# Patient Record
Sex: MALE | Race: WHITE | Employment: PART TIME | URBAN - METROPOLITAN AREA
[De-identification: names, ages, dates, MRNs, and addresses within clinical notes are randomized per-mention and may not be internally consistent; named-entity substitution may affect disease eponyms.]

---

## 2019-12-27 ENCOUNTER — AMBULATORY - HEALTHEAST (OUTPATIENT)
Dept: LAB | Facility: CLINIC | Age: 74
End: 2019-12-27

## 2019-12-27 DIAGNOSIS — Z85.6 PERSONAL HISTORY OF LYMPHOID LEUKEMIA: ICD-10-CM

## 2019-12-27 LAB
ERYTHROCYTE [DISTWIDTH] IN BLOOD BY AUTOMATED COUNT: 16.1 % (ref 11–14.5)
HCT VFR BLD AUTO: 31.1 % (ref 40–54)
HGB BLD-MCNC: 10.4 G/DL (ref 14–18)
MCH RBC QN AUTO: 34.3 PG (ref 27–34)
MCHC RBC AUTO-ENTMCNC: 33.4 G/DL (ref 32–36)
MCV RBC AUTO: 103 FL (ref 80–100)
PLATELET # BLD AUTO: 199 THOU/UL (ref 140–440)
PMV BLD AUTO: 9.9 FL (ref 8.5–12.5)
RBC # BLD AUTO: 3.03 MILL/UL (ref 4.4–6.2)
WBC: 5.1 THOU/UL (ref 4–11)

## 2020-02-27 ENCOUNTER — MEDICAL CORRESPONDENCE (OUTPATIENT)
Dept: TRANSPLANT | Facility: CLINIC | Age: 75
End: 2020-02-27

## 2020-02-27 ENCOUNTER — ALLIED HEALTH/NURSE VISIT (OUTPATIENT)
Dept: TRANSPLANT | Facility: CLINIC | Age: 75
End: 2020-02-27
Attending: INTERNAL MEDICINE
Payer: COMMERCIAL

## 2020-02-27 VITALS
SYSTOLIC BLOOD PRESSURE: 131 MMHG | HEART RATE: 73 BPM | DIASTOLIC BLOOD PRESSURE: 83 MMHG | OXYGEN SATURATION: 97 % | WEIGHT: 215.1 LBS | RESPIRATION RATE: 14 BRPM | TEMPERATURE: 98.5 F

## 2020-02-27 DIAGNOSIS — C90.10 PLASMA CELL LEUKEMIA (H): Primary | ICD-10-CM

## 2020-02-27 DIAGNOSIS — Z71.9 VISIT FOR COUNSELING: Primary | ICD-10-CM

## 2020-02-27 DIAGNOSIS — C90.10 PLASMA CELL LEUKEMIA NOT HAVING ACHIEVED REMISSION (H): Primary | ICD-10-CM

## 2020-02-27 PROCEDURE — G0463 HOSPITAL OUTPT CLINIC VISIT: HCPCS | Mod: ZF

## 2020-02-27 PROCEDURE — 40000268 ZZH STATISTIC NO CHARGES: Mod: ZF

## 2020-02-27 RX ORDER — PRAZOSIN HYDROCHLORIDE 2 MG/1
2 CAPSULE ORAL AT BEDTIME
COMMUNITY
Start: 2011-06-08

## 2020-02-27 RX ORDER — CITALOPRAM HYDROBROMIDE 20 MG/1
30 TABLET ORAL DAILY
COMMUNITY

## 2020-02-27 RX ORDER — TRIAMTERENE/HYDROCHLOROTHIAZID 37.5-25 MG
0.5 TABLET ORAL DAILY
COMMUNITY

## 2020-02-27 RX ORDER — ALBUTEROL SULFATE 90 UG/1
1-2 AEROSOL, METERED RESPIRATORY (INHALATION) EVERY 4 HOURS PRN
COMMUNITY
Start: 2018-10-18

## 2020-02-27 ASSESSMENT — PAIN SCALES - GENERAL: PAINLEVEL: NO PAIN (0)

## 2020-02-27 NOTE — LETTER
2/27/2020       RE: You Silva  [address] [facility] [address]     Dear Colleague,    Thank you for referring your patient, You Silva, to the [facility] BLOOD AND MARROW TRANSPLANT at [facility]. Please see a copy of my visit note below.    BMT Clinic Visit  Feb 27, 2020      Reason for Visit: Consult regarding auto stem cell transplant    Disease and Treatment History:  1.  Presented to his primary care doc for annual follow-up and was found to have abnormal blood counts in November 2019 with circulating atypical plasma cells.  Noted 6 months of preceding increasing fatigue and weight loss of about 15 pounds  2. Peripheral blood on 11/19/2019 had 41% atypical plasma cells.  -Bone marrow biopsy on 11/20/2019 was 90% cellularity with diffuse and extensive infiltrate of plasma cells. FISH showed Gain 1q, Del 13q, T(14;16) consistent with high risk disease   -  kappa light chains at diagnosis elevated at 11,055 and lambda light chains at 1.8.  Serum protein electrophoresis monoclonal peak 0.13  -Bone survey on 11/25/2019 was negative   -MUGA showed an EF of 67%  3. Initiated on weekly Cytoxan plus biweekly Velcade plus Decadron.  Cycle 1 was on 11/20/20192.    -Initial great response with kappa light chains down to 252 as of the end of November  -Cycle 2 (12/23/2019) adjusted to standard dose Cytoxan and Velcade and dexamethasone weekly   - Cycle 3 began on January 22, 2020  -  Cycle 4 began on 2/19/2020     HPI: You has tolerated his chemotherapy very well at this point and actually was relatively asymptomatic at diagnosis.  He notes that the only side effect has been some irritated watery and red eyes.  He notes no current fevers and chills.  No chest pain or shortness of breath, no nausea no vomiting, no diarrhea.  He notes no bone pain no bleeding or bruising and no change in baseline neuropathy     10 point review of systems otherwise negative.      Past  medical history:  1.  Plasma cell leukemia see HPI  2.  History of mild COPD  3.  History of esophageal spasm and hiatal hernia  4.  History of hypertension  5.  History of obstructive sleep apnea using a CPAP machine  6.  History of PTSD  7.  History of major depressive disorder  8.  History of numerous basal cell carcinomas status post numerous both surgery    No other diabetes, liver, kidney, cardiac issues    Medications include Bactrim, acyclovir, and, nystatin,    Social history: Former tobacco user 41 pack years quit in 2008.  Alcohol previously drank about 3 or 7 cans of beer a week.  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  Was involved with hostage situations and [employer] team.  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  Is remarried and has been with his current partner for the last 25 years.  Has 2 biologic children and his current wife has 2 children 1 of which accompanies him today.  Did have exposure to agent orange as well as water sanitation issue in the [facility]     Family history is noncontributory.      Physical Exam:  /83   Pulse 73   Temp 98.5  F (36.9  C) (Oral)   Resp 14   Wt 97.6 kg (215 lb 1.6 oz)   SpO2 97%   General: KPS 90  HEENT: Mild scleral injection but no icterus.  Oropharynx is clear without any thrush or palpable lymphadenopathy  Lungs: Clear to all station bilaterally  Cardiovascular: Regular rate and rhythm without any rubs or gallops  Extremities: No edema and no rash     Labs: Outside pathology and labs reviewed    Assessment and plan: 74-year-old gentleman with recent diagnosis of plasma cell leukemia currently showing a good serologic response to Cytoxan plus Velcade plus dexamethasone    1.  Plasma cell leukemia/myeloma:    We discussed the approach to myeloma therapy/plama cell leukemia being one of induction therapy to try to get the best response possible, and that is what he is  currently in the process of doing right now. We discussed a consolidation approach of autologous stem cell transplant or high-dose chemotherapy with stem cell rescue to further consolidate his remission, with the hopes of prolonging his progression-free survival on the average of 2-3 years.     We discussed the overall process of work-up week of testing, signing consents, and then stem cell collection. We reviewed that stem cell collection can be completed with either growth factor or chemotherapy plus growth factor stem cell mobilization to push the stem cells out into the peripheral blood and that the decision is based on the disease status at work-up. If in CR we would do G-CSF priming alone and if VGPR we would use cytoxan + G-CSF. We reviewed the admission to the hospital with high-dose melphalan, and then the subsequent infusion of the stem cells for hematopoietic rescue.     We reviewed the timing of this; that the stem cell collection after mobilization with growth factors would approximately be about a week versus if we did chemo mobilization, and that process would take on the order of 2-3 weeks before stem cells were collected.     We discussed the admission for the transplant, and the use of high-dose melphalan therapy, the risks of mucositis, nausea, vomiting, diarrhea, hair loss, drop in blood counts, infections and transplant-related mortality on the order of 3%, the need to stay in the hospital for approximately 3-4 days for the chemo and then the infusion of stem cells, and then the waiting for count recovery. We discussed the process that once he is discharged from the hospital, he would come to the clinic daily until his counts have recovered, and we would restage his myeloma at approximately day 28. We discussed that this entire process would need to be completed here at the [facility], but that once he reaches day 28, he would return to the care of his primary oncologist     We then  discussed the use of Velcade maintenance therapy given his high risk disease following autologous stem cell transplant, with the hope that that would continue to prolong the time before he would need more significant interventions for his myeloma.     We again reviewed that this is not curative therapy, but therapy that can, hopefully, improve his progression-free survival.     We reviewed the alternative of consolidation cycles of chemotherapy and then maintenance following a remission extrapolating from the randomized study comparing RVD induction with randomization to RVD consolidation versus Auto transplant with all getting maintenance therapy. This study showed no survival benefit but did show improved PFS for the Auto arm.    Thus, the decision regarding how to proceed is one that is a personal decision. If the auto transplant can delay the time to need for additional treatment, then that delays the using of alternative therapies until later thus potentially in the long run, extending life.    He and his family asked great questions and at the end felt as if they were answered. HE is interested in moving forward with transplant work-up.    Thus, we will look at bringing him for work-up around 3/16.  2. ID: acyclovir and bactrim prophy    3. CV: BP adequate       4. Pulmon: mild COPD and AMRITA    5. FEN/Renal: stable    6. Psych: depression. On celexa    Final Plan:  - complete current CyBorD cycle  - consider work-up the week of 3/16/2020    80 minutes spent with the patient with the majority in direct face to face consultation    Betty Maradiaga MD

## 2020-02-27 NOTE — NURSING NOTE
Oncology Rooming Note    February 27, 2020 7:59 AM   You Silva is a 74 year old male who presents for:    Chief Complaint   Patient presents with     Oncology Clinic Visit     New - History of basal cell carcinoma     Initial Vitals: /83   Pulse 73   Temp 98.5  F (36.9  C) (Oral)   Resp 14   SpO2 97%  There is no height or weight on file to calculate BMI. There is no height or weight on file to calculate BSA.  No Pain (0) Comment: Data Unavailable   No LMP for male patient.  Allergies reviewed: Yes  Medications reviewed: Yes    Medications: Medication refills not needed today.  Pharmacy name entered into EPIC: Data Unavailable    Clinical concerns: Patient has no new concerns.       David Burleson,EMT

## 2020-02-27 NOTE — PROGRESS NOTES
BMT Clinic Visit  Feb 27, 2020      Reason for Visit: Consult regarding auto stem cell transplant    Disease and Treatment History:  1.  Presented to his primary care doc for annual follow-up and was found to have abnormal blood counts in November 2019 with circulating atypical plasma cells.  Noted 6 months of preceding increasing fatigue and weight loss of about 15 pounds  2. Peripheral blood on 11/19/2019 had 41% atypical plasma cells.  -Bone marrow biopsy on 11/20/2019 was 90% cellularity with diffuse and extensive infiltrate of plasma cells. FISH showed Gain 1q, Del 13q, T(14;16) consistent with high risk disease   -  kappa light chains at diagnosis elevated at 11,055 and lambda light chains at 1.8.  Serum protein electrophoresis monoclonal peak 0.13  -Bone survey on 11/25/2019 was negative   -MUGA showed an EF of 67%  3. Initiated on weekly Cytoxan plus biweekly Velcade plus Decadron.  Cycle 1 was on 11/20/20192.    -Initial great response with kappa light chains down to 252 as of the end of November  -Cycle 2 (12/23/2019) adjusted to standard dose Cytoxan and Velcade and dexamethasone weekly   - Cycle 3 began on January 22, 2020  -  Cycle 4 began on 2/19/2020     HPI: You has tolerated his chemotherapy very well at this point and actually was relatively asymptomatic at diagnosis.  He notes that the only side effect has been some irritated watery and red eyes.  He notes no current fevers and chills.  No chest pain or shortness of breath, no nausea no vomiting, no diarrhea.  He notes no bone pain no bleeding or bruising and no change in baseline neuropathy     10 point review of systems otherwise negative.      Past medical history:  1.  Plasma cell leukemia see HPI  2.  History of mild COPD  3.  History of esophageal spasm and hiatal hernia  4.  History of hypertension  5.  History of obstructive sleep apnea using a CPAP machine  6.  History of PTSD  7.  History of major depressive disorder  8.  History of  numerous basal cell carcinomas status post numerous both surgery    No other diabetes, liver, kidney, cardiac issues    Medications include Bactrim, acyclovir, and, nystatin,    Social history: Former tobacco user 41 pack years quit in 2008.  Alcohol previously drank about 3 or 7 cans of beer a week.  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  Is remarried and has been with his current partner for the last 25 years.  Has 2 biologic children and his current wife has 2 children 1 of which accompanies him today.  Did have exposure to agent orange as well as water sanitation issue in the [facility]     Family history is noncontributory.      Physical Exam:  /83   Pulse 73   Temp 98.5  F (36.9  C) (Oral)   Resp 14   Wt 97.6 kg (215 lb 1.6 oz)   SpO2 97%   General: KPS 90  HEENT: Mild scleral injection but no icterus.  Oropharynx is clear without any thrush or palpable lymphadenopathy  Lungs: Clear to all station bilaterally  Cardiovascular: Regular rate and rhythm without any rubs or gallops  Extremities: No edema and no rash     Labs: Outside pathology and labs reviewed    Assessment and plan: 74-year-old gentleman with recent diagnosis of plasma cell leukemia currently showing a good serologic response to Cytoxan plus Velcade plus dexamethasone    1.  Plasma cell leukemia/myeloma:    We discussed the approach to myeloma therapy/plama cell leukemia being one of induction therapy to try to get the best response possible, and that is what he is currently in the process of doing right now. We discussed a consolidation approach of autologous stem cell transplant or high-dose chemotherapy with stem cell rescue to further consolidate his remission, with the hopes of prolonging his progression-free survival on the average of 2-3 years.      We discussed the overall process of work-up week of testing, signing consents, and then stem cell collection. We reviewed that stem cell collection can be completed with either growth factor or chemotherapy plus growth factor stem cell mobilization to push the stem cells out into the peripheral blood and that the decision is based on the disease status at work-up. If in CR we would do G-CSF priming alone and if VGPR we would use cytoxan + G-CSF. We reviewed the admission to the hospital with high-dose melphalan, and then the subsequent infusion of the stem cells for hematopoietic rescue.     We reviewed the timing of this; that the stem cell collection after mobilization with growth factors would approximately be about a week versus if we did chemo mobilization, and that process would take on the order of 2-3 weeks before stem cells were collected.     We discussed the admission for the transplant, and the use of high-dose melphalan therapy, the risks of mucositis, nausea, vomiting, diarrhea, hair loss, drop in blood counts, infections and transplant-related mortality on the order of 3%, the need to stay in the hospital for approximately 3-4 days for the chemo and then the infusion of stem cells, and then the waiting for count recovery. We discussed the process that once he is discharged from the hospital, he would come to the clinic daily until his counts have recovered, and we would restage his myeloma at approximately day 28. We discussed that this entire process would need to be completed here at the [facility], but that once he reaches day 28, he would return to the care of his primary oncologist     We then discussed the use of Velcade maintenance therapy given his high risk disease following autologous stem cell transplant, with the hope that that would continue to prolong the time before he would need more significant interventions for his myeloma.     We again reviewed that this is not  curative therapy, but therapy that can, hopefully, improve his progression-free survival.     We reviewed the alternative of consolidation cycles of chemotherapy and then maintenance following a remission extrapolating from the randomized study comparing RVD induction with randomization to RVD consolidation versus Auto transplant with all getting maintenance therapy. This study showed no survival benefit but did show improved PFS for the Auto arm.    Thus, the decision regarding how to proceed is one that is a personal decision. If the auto transplant can delay the time to need for additional treatment, then that delays the using of alternative therapies until later thus potentially in the long run, extending life.    He and his family asked great questions and at the end felt as if they were answered. HE is interested in moving forward with transplant work-up.    Thus, we will look at bringing him for work-up around 3/16.  2. ID: acyclovir and bactrim prophy    3. CV: BP adequate       4. Pulmon: mild COPD and AMRITA    5. FEN/Renal: stable    6. Psych: depression. On celexa    Final Plan:  - complete current CyBorD cycle  - consider work-up the week of 3/16/2020    80 minutes spent with the patient with the majority in direct face to face consultation    Betty Maradiaga MD

## 2020-02-27 NOTE — PROGRESS NOTES
"Blood and Marrow Transplant   New Transplant Visit with   Clinical     You Silva  2/27/2020    New Transplant MD:  Betty Maradiaga MD  New Transplant NC:  Sadia Newman RN    With whom do you live: Wife - Kasia (they have been  for 10-12 years and in a committed relationship for 25 years)    Relocation Requirement:   You \"Tonny\" LIZETH Silva lives 22 minutes / 18 miles from [facility] and will not be required to relocate post-transplant.     Diagnosis: Plasma Cell Leukemia    Transplant Type: Autologous    Family Information  Next of Kin: Kasia Silva    Phone Number: [phone]    Siblings: none    Children: Patient has 2 biological children - Annabel and Andi (they live in [place]; patient has no contact with them and they are not aware of his illness)    Wife has 2 biological questions - Toñito and Betty (they both live locally; Toñito accompanies them today)    Employment  Tonny was in the [employer] for 25 years - including tours of duty overseas. 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     Source of Income: shelter savings, [facility] connection through [facility] (he is 100% service connected - he was at [employer] and exposed to the drinking water), and pension    Do you have concerns or questions about finances or insurance related to BMT?: They will call Sepide[facility] Vega to learn about deductibles/max OOP. He will be required to get all of his medications [facility] at the [facility] - AFTER the 1st fill at [facility] s/p discharge from transplant stay.     Have you completed a health care directive?: Yes - [facility]. They will see if they have a copy at home. We may need to do a MAGGIE to get a copy from the [facility].     Support:  Is there a network of people who are available to support you and/or your family?: Yes    Education Provided:   Caregiver Requirement/Role  BMT Packet provided  BMT Book provided  HCD information and blank document  Support resources  Description of " "Inpatient Unit    Comments: Tonny comes to his NT appointment with his wife Kasia and Kasia's son Toñito. Tonny felt that his discussion with Dr. Maradiaga was \"very thorough\" and they understand the process now. We talked about the process of transplant - they were told by the [facility] MD that his wife would need to be with patient 24 hours/day while hospitalized - we talked about how this is not accurate. He will require a caregiver 30 days post-transplant 24/7. They have no concerns about caregivers. Encouraged them to call with questions or concerns as they arise.     Lacey FLOYD [facility]    Clinical   2/27/2020  [facility]  Adult Blood and Marrow Transplant Program  [address] [place] [id number]  [email or web address]  [email or web address]  Office: [phone]   Pager: [phone]        "

## 2020-03-02 ENCOUNTER — DOCUMENTATION ONLY (OUTPATIENT)
Dept: TRANSPLANT | Facility: CLINIC | Age: 75
End: 2020-03-02

## 2020-03-02 DIAGNOSIS — C90.11 PLASMA CELL LEUKEMIA IN REMISSION (H): Primary | ICD-10-CM

## 2020-03-02 DIAGNOSIS — Z86.2 PERSONAL HISTORY OF DISEASES OF BLOOD AND BLOOD-FORMING ORGANS: ICD-10-CM

## 2020-03-02 NOTE — PROGRESS NOTES
You Silva's medical conditions were reviewed at the BMT Protocol Review Committee meeting on March 2, 2020    Considerations from the Committee included the following:  Plasma cell leukemia    BMT Primary Protocol: Auto Myeloma    BMT Ancillary Protocols:     N/A    There is no problem list on file for this patient.      Patient Care Team       Relationship Specialty Notifications Start End    Stefano Mcleod MD PCP - General   2/27/20     Phone: [phone] Fax: [phone]         [facility] [address] [place] [id number]    Stefano Mcleod MD Referring Physician Internal Medicine-Hematology & Oncology  2/12/20     Phone: [phone] Fax: [phone]         [facility] [address] [place] [id number]

## 2020-03-02 NOTE — PROGRESS NOTES
Met with You, wife and S-I-L following new transplant visit with Dr. Maradiaga. Reviewed plan of care per NT conversation for Stem Cell Transplant. Explained role of the Nurse Coordinator throughout the BMT process as well as general time line and expectations for transplant. Discussed necessity of caregiver and program's proximity requirements. All questions were answered. Plan:  AutologousTransplant following current cycle of CyBordD    Contact information provided for Yves Ortiz: Yes    HLA typing drawn: No    PRA typing drawn: No    Contact information provided for : No    Financial Release for [facility] search obtained: No

## 2020-03-05 PROCEDURE — 00000345 ZZHCL STATISTIC REV BONE MARROW OUTSIDE SLIDES TC 88321: Performed by: INTERNAL MEDICINE

## 2020-03-06 ENCOUNTER — DOCUMENTATION ONLY (OUTPATIENT)
Dept: CARE COORDINATION | Facility: CLINIC | Age: 75
End: 2020-03-06

## 2020-03-06 LAB — COPATH REPORT: NORMAL

## 2020-03-06 NOTE — TELEPHONE ENCOUNTER
DIAGNOSIS: AUTO DOUBLE LUMEN LARGE BOR TUNNELED PHERESIS CATH   DATE RECEIVED: 3.16.20   NOTES STATUS DETAILS   OFFICE NOTE from referring provider Internal 2.27.20 Dr. Betty Maradiaga   OFFICE NOTE from other specialist N/A    DISCHARGE SUMMARY from hospital N/A    DISCHARGE REPORT from the ER N/A    OPERATIVE REPORT N/A    MEDICATION LIST Internal    XRAYS (IMAGES & REPORTS) In Pacs PET- *sched* for 3.17.20  Chest Xray- *sched* for 3.16.20, 12.23.19   PATHOLOGY  Slides & report N/A

## 2020-03-11 ENCOUNTER — MEDICAL CORRESPONDENCE (OUTPATIENT)
Dept: TRANSPLANT | Facility: CLINIC | Age: 75
End: 2020-03-11

## 2020-03-13 PROCEDURE — 00000345 ZZHCL STATISTIC REV BONE MARROW OUTSIDE SLIDES TC 88321: Performed by: INTERNAL MEDICINE

## 2020-03-16 ENCOUNTER — PRE VISIT (OUTPATIENT)
Dept: INTERVENTIONAL RADIOLOGY/VASCULAR | Facility: CLINIC | Age: 75
End: 2020-03-16

## 2020-03-16 ENCOUNTER — MEDICAL CORRESPONDENCE (OUTPATIENT)
Dept: TRANSPLANT | Facility: CLINIC | Age: 75
End: 2020-03-16

## 2020-03-16 LAB — COPATH REPORT: NORMAL

## 2020-03-17 ENCOUNTER — MEDICAL CORRESPONDENCE (OUTPATIENT)
Dept: TRANSPLANT | Facility: CLINIC | Age: 75
End: 2020-03-17

## 2020-03-23 ENCOUNTER — OFFICE VISIT (OUTPATIENT)
Dept: TRANSPLANT | Facility: CLINIC | Age: 75
End: 2020-03-23
Attending: INTERNAL MEDICINE
Payer: COMMERCIAL

## 2020-03-23 ENCOUNTER — PRE VISIT (OUTPATIENT)
Dept: INTERVENTIONAL RADIOLOGY/VASCULAR | Facility: CLINIC | Age: 75
End: 2020-03-23

## 2020-03-23 ENCOUNTER — OFFICE VISIT (OUTPATIENT)
Dept: INTERVENTIONAL RADIOLOGY/VASCULAR | Facility: CLINIC | Age: 75
End: 2020-03-23
Attending: INTERNAL MEDICINE
Payer: COMMERCIAL

## 2020-03-23 VITALS
HEART RATE: 67 BPM | BODY MASS INDEX: 28.31 KG/M2 | HEIGHT: 72 IN | TEMPERATURE: 97.4 F | OXYGEN SATURATION: 93 % | DIASTOLIC BLOOD PRESSURE: 80 MMHG | RESPIRATION RATE: 18 BRPM | WEIGHT: 209 LBS | SYSTOLIC BLOOD PRESSURE: 130 MMHG

## 2020-03-23 DIAGNOSIS — C90.11 PLASMA CELL LEUKEMIA IN REMISSION (H): ICD-10-CM

## 2020-03-23 DIAGNOSIS — Z86.2 PERSONAL HISTORY OF DISEASES OF BLOOD AND BLOOD-FORMING ORGANS: ICD-10-CM

## 2020-03-23 DIAGNOSIS — C90.11 PLASMA CELL LEUKEMIA IN REMISSION (H): Primary | ICD-10-CM

## 2020-03-23 DIAGNOSIS — R82.90 UNSPECIFIED ABNORMAL FINDINGS IN URINE: ICD-10-CM

## 2020-03-23 DIAGNOSIS — K22.4 ESOPHAGEAL SPASM: Primary | ICD-10-CM

## 2020-03-23 PROBLEM — C90.10: Status: ACTIVE | Noted: 2020-03-23

## 2020-03-23 PROBLEM — Z87.19 S/P LAPAROSCOPIC HERNIA REPAIR: Status: ACTIVE | Noted: 2017-02-16

## 2020-03-23 PROBLEM — Z85.828 HISTORY OF BASAL CELL CARCINOMA: Status: ACTIVE | Noted: 2018-07-31

## 2020-03-23 PROBLEM — G47.33 OBSTRUCTIVE SLEEP APNEA ON CPAP: Status: ACTIVE | Noted: 2017-11-17

## 2020-03-23 PROBLEM — J44.9 COPD, MILD (H): Status: ACTIVE | Noted: 2018-02-08

## 2020-03-23 PROBLEM — Z98.890 S/P LAPAROSCOPIC HERNIA REPAIR: Status: ACTIVE | Noted: 2017-02-16

## 2020-03-23 PROBLEM — Z87.891 PERSONAL HISTORY OF TOBACCO USE, PRESENTING HAZARDS TO HEALTH: Status: ACTIVE | Noted: 2018-02-08

## 2020-03-23 LAB
ABO + RH BLD: NORMAL
ABO + RH BLD: NORMAL
ALBUMIN SERPL-MCNC: 3.8 G/DL (ref 3.4–5)
ALBUMIN UR-MCNC: NEGATIVE MG/DL
ALP SERPL-CCNC: 69 U/L (ref 40–150)
ALT SERPL W P-5'-P-CCNC: 18 U/L (ref 0–70)
ANION GAP SERPL CALCULATED.3IONS-SCNC: 4 MMOL/L (ref 3–14)
APPEARANCE UR: CLEAR
APTT PPP: 29 SEC (ref 22–37)
AST SERPL W P-5'-P-CCNC: 9 U/L (ref 0–45)
AUTO BMR FREEZE: NORMAL
B2 MICROGLOB SERPL-MCNC: 2.3 MG/L
BACTERIA #/AREA URNS HPF: ABNORMAL /HPF
BASOPHILS # BLD AUTO: 0 10E9/L (ref 0–0.2)
BASOPHILS NFR BLD AUTO: 0.6 %
BILIRUB SERPL-MCNC: 0.6 MG/DL (ref 0.2–1.3)
BILIRUB UR QL STRIP: NEGATIVE
BLD GP AB SCN SERPL QL: NORMAL
BLOOD BANK CMNT PATIENT-IMP: NORMAL
BUN SERPL-MCNC: 12 MG/DL (ref 7–30)
CALCIUM SERPL-MCNC: 9.3 MG/DL (ref 8.5–10.1)
CHLORIDE SERPL-SCNC: 107 MMOL/L (ref 94–109)
CO2 SERPL-SCNC: 28 MMOL/L (ref 20–32)
COLOR UR AUTO: YELLOW
CREAT SERPL-MCNC: 0.67 MG/DL (ref 0.66–1.25)
DEPRECATED CALCIDIOL+CALCIFEROL SERPL-MC: 46 UG/L (ref 20–75)
DIFFERENTIAL METHOD BLD: ABNORMAL
EBV VCA IGG SER QL IA: >8 AI (ref 0–0.8)
EOSINOPHIL # BLD AUTO: 0.1 10E9/L (ref 0–0.7)
EOSINOPHIL NFR BLD AUTO: 1.5 %
ERYTHROCYTE [DISTWIDTH] IN BLOOD BY AUTOMATED COUNT: 15.9 % (ref 10–15)
GFR SERPL CREATININE-BSD FRML MDRD: >90 ML/MIN/{1.73_M2}
GLUCOSE SERPL-MCNC: 97 MG/DL (ref 70–99)
GLUCOSE UR STRIP-MCNC: NEGATIVE MG/DL
HCT VFR BLD AUTO: 39.4 % (ref 40–53)
HGB BLD-MCNC: 13.3 G/DL (ref 13.3–17.7)
HGB UR QL STRIP: NEGATIVE
HSV1 IGG SERPL QL IA: 1.5 AI (ref 0–0.8)
HSV2 IGG SERPL QL IA: 4.6 AI (ref 0–0.8)
IGE SERPL-ACNC: <2 KIU/L (ref 0–114)
IMM GRANULOCYTES # BLD: 0 10E9/L (ref 0–0.4)
IMM GRANULOCYTES NFR BLD: 0.8 %
INR PPP: 1.04 (ref 0.86–1.14)
KAPPA LC UR-MCNC: 43.53 MG/DL (ref 0.33–1.94)
KAPPA LC/LAMBDA SER: 229.11 {RATIO} (ref 0.26–1.65)
KETONES UR STRIP-MCNC: NEGATIVE MG/DL
LAMBDA LC SERPL-MCNC: 0.19 MG/DL (ref 0.57–2.63)
LDH SERPL L TO P-CCNC: 152 U/L (ref 85–227)
LEUKOCYTE ESTERASE UR QL STRIP: NEGATIVE
LYMPHOCYTES # BLD AUTO: 0.8 10E9/L (ref 0.8–5.3)
LYMPHOCYTES NFR BLD AUTO: 15.2 %
MAGNESIUM SERPL-MCNC: 2.2 MG/DL (ref 1.6–2.3)
MCH RBC QN AUTO: 31.5 PG (ref 26.5–33)
MCHC RBC AUTO-ENTMCNC: 33.8 G/DL (ref 31.5–36.5)
MCV RBC AUTO: 93 FL (ref 78–100)
MONOCYTES # BLD AUTO: 0.7 10E9/L (ref 0–1.3)
MONOCYTES NFR BLD AUTO: 12.8 %
MUCOUS THREADS #/AREA URNS LPF: PRESENT /LPF
NEUTROPHILS # BLD AUTO: 3.6 10E9/L (ref 1.6–8.3)
NEUTROPHILS NFR BLD AUTO: 69.1 %
NITRATE UR QL: NEGATIVE
NRBC # BLD AUTO: 0 10*3/UL
NRBC BLD AUTO-RTO: 0 /100
PH UR STRIP: 6 PH (ref 5–7)
PHOSPHATE SERPL-MCNC: 2.7 MG/DL (ref 2.5–4.5)
PLATELET # BLD AUTO: 243 10E9/L (ref 150–450)
POTASSIUM SERPL-SCNC: 3.8 MMOL/L (ref 3.4–5.3)
PROT SERPL-MCNC: 6.1 G/DL (ref 6.8–8.8)
RBC # BLD AUTO: 4.22 10E12/L (ref 4.4–5.9)
RBC #/AREA URNS AUTO: <1 /HPF (ref 0–2)
SODIUM SERPL-SCNC: 138 MMOL/L (ref 133–144)
SOURCE: ABNORMAL
SP GR UR STRIP: 1.01 (ref 1–1.03)
SPECIMEN EXP DATE BLD: NORMAL
URATE SERPL-MCNC: 5 MG/DL (ref 3.5–7.2)
UROBILINOGEN UR STRIP-MCNC: 0 MG/DL (ref 0–2)
WBC # BLD AUTO: 5.3 10E9/L (ref 4–11)
WBC #/AREA URNS AUTO: <1 /HPF (ref 0–5)

## 2020-03-23 PROCEDURE — 82784 ASSAY IGA/IGD/IGG/IGM EACH: CPT | Performed by: INTERNAL MEDICINE

## 2020-03-23 PROCEDURE — 81050 URINALYSIS VOLUME MEASURE: CPT | Performed by: INTERNAL MEDICINE

## 2020-03-23 PROCEDURE — 82306 VITAMIN D 25 HYDROXY: CPT | Performed by: INTERNAL MEDICINE

## 2020-03-23 PROCEDURE — 81001 URINALYSIS AUTO W/SCOPE: CPT | Performed by: INTERNAL MEDICINE

## 2020-03-23 PROCEDURE — 82232 ASSAY OF BETA-2 PROTEIN: CPT | Performed by: INTERNAL MEDICINE

## 2020-03-23 PROCEDURE — 87086 URINE CULTURE/COLONY COUNT: CPT | Performed by: INTERNAL MEDICINE

## 2020-03-23 PROCEDURE — 86850 RBC ANTIBODY SCREEN: CPT | Performed by: INTERNAL MEDICINE

## 2020-03-23 PROCEDURE — 84100 ASSAY OF PHOSPHORUS: CPT | Performed by: INTERNAL MEDICINE

## 2020-03-23 PROCEDURE — 81370 HLA I & II TYPING LR: CPT | Performed by: INTERNAL MEDICINE

## 2020-03-23 PROCEDURE — 83615 LACTATE (LD) (LDH) ENZYME: CPT | Performed by: INTERNAL MEDICINE

## 2020-03-23 PROCEDURE — 84165 PROTEIN E-PHORESIS SERUM: CPT | Performed by: INTERNAL MEDICINE

## 2020-03-23 PROCEDURE — 83021 HEMOGLOBIN CHROMOTOGRAPHY: CPT | Performed by: INTERNAL MEDICINE

## 2020-03-23 PROCEDURE — 86665 EPSTEIN-BARR CAPSID VCA: CPT | Performed by: INTERNAL MEDICINE

## 2020-03-23 PROCEDURE — 93005 ELECTROCARDIOGRAM TRACING: CPT

## 2020-03-23 PROCEDURE — 86695 HERPES SIMPLEX TYPE 1 TEST: CPT | Performed by: INTERNAL MEDICINE

## 2020-03-23 PROCEDURE — 80053 COMPREHEN METABOLIC PANEL: CPT | Performed by: INTERNAL MEDICINE

## 2020-03-23 PROCEDURE — 84550 ASSAY OF BLOOD/URIC ACID: CPT | Performed by: INTERNAL MEDICINE

## 2020-03-23 PROCEDURE — 86803 HEPATITIS C AB TEST: CPT | Performed by: INTERNAL MEDICINE

## 2020-03-23 PROCEDURE — 87798 DETECT AGENT NOS DNA AMP: CPT | Mod: XU | Performed by: INTERNAL MEDICINE

## 2020-03-23 PROCEDURE — 83735 ASSAY OF MAGNESIUM: CPT | Performed by: INTERNAL MEDICINE

## 2020-03-23 PROCEDURE — 85730 THROMBOPLASTIN TIME PARTIAL: CPT | Performed by: INTERNAL MEDICINE

## 2020-03-23 PROCEDURE — 83883 ASSAY NEPHELOMETRY NOT SPEC: CPT | Performed by: INTERNAL MEDICINE

## 2020-03-23 PROCEDURE — 84166 PROTEIN E-PHORESIS/URINE/CSF: CPT | Performed by: INTERNAL MEDICINE

## 2020-03-23 PROCEDURE — 86753 PROTOZOA ANTIBODY NOS: CPT | Performed by: INTERNAL MEDICINE

## 2020-03-23 PROCEDURE — 86644 CMV ANTIBODY: CPT | Performed by: INTERNAL MEDICINE

## 2020-03-23 PROCEDURE — 85025 COMPLETE CBC W/AUTO DIFF WBC: CPT | Performed by: INTERNAL MEDICINE

## 2020-03-23 PROCEDURE — 00000095 ZZHCL STATISTIC CREATININE CLEARANCE: Performed by: INTERNAL MEDICINE

## 2020-03-23 PROCEDURE — 81376 HLA II TYPING 1 LOCUS LR: CPT | Mod: XU | Performed by: INTERNAL MEDICINE

## 2020-03-23 PROCEDURE — G0463 HOSPITAL OUTPT CLINIC VISIT: HCPCS

## 2020-03-23 PROCEDURE — 86703 HIV-1/HIV-2 1 RESULT ANTBDY: CPT | Performed by: INTERNAL MEDICINE

## 2020-03-23 PROCEDURE — 86780 TREPONEMA PALLIDUM: CPT | Performed by: INTERNAL MEDICINE

## 2020-03-23 PROCEDURE — 82785 ASSAY OF IGE: CPT | Performed by: INTERNAL MEDICINE

## 2020-03-23 PROCEDURE — 86334 IMMUNOFIX E-PHORESIS SERUM: CPT | Performed by: INTERNAL MEDICINE

## 2020-03-23 PROCEDURE — 86335 IMMUNFIX E-PHORSIS/URINE/CSF: CPT | Performed by: INTERNAL MEDICINE

## 2020-03-23 PROCEDURE — 87516 HEPATITIS B DNA AMP PROBE: CPT | Mod: XU | Performed by: INTERNAL MEDICINE

## 2020-03-23 PROCEDURE — 87535 HIV-1 PROBE&REVERSE TRNSCRPJ: CPT | Performed by: INTERNAL MEDICINE

## 2020-03-23 PROCEDURE — 86687 HTLV-I ANTIBODY: CPT | Performed by: INTERNAL MEDICINE

## 2020-03-23 PROCEDURE — 00000402 ZZHCL STATISTIC TOTAL PROTEIN: Performed by: INTERNAL MEDICINE

## 2020-03-23 PROCEDURE — 86704 HEP B CORE ANTIBODY TOTAL: CPT | Performed by: INTERNAL MEDICINE

## 2020-03-23 PROCEDURE — 87521 HEPATITIS C PROBE&RVRS TRNSC: CPT | Performed by: INTERNAL MEDICINE

## 2020-03-23 PROCEDURE — 84156 ASSAY OF PROTEIN URINE: CPT | Performed by: INTERNAL MEDICINE

## 2020-03-23 PROCEDURE — 86696 HERPES SIMPLEX TYPE 2 TEST: CPT | Performed by: INTERNAL MEDICINE

## 2020-03-23 PROCEDURE — 87340 HEPATITIS B SURFACE AG IA: CPT | Performed by: INTERNAL MEDICINE

## 2020-03-23 PROCEDURE — G0463 HOSPITAL OUTPT CLINIC VISIT: HCPCS | Mod: 25,ZF

## 2020-03-23 PROCEDURE — 93010 ELECTROCARDIOGRAM REPORT: CPT | Mod: ZP | Performed by: INTERNAL MEDICINE

## 2020-03-23 PROCEDURE — 86901 BLOOD TYPING SEROLOGIC RH(D): CPT | Performed by: INTERNAL MEDICINE

## 2020-03-23 PROCEDURE — 86900 BLOOD TYPING SEROLOGIC ABO: CPT | Performed by: INTERNAL MEDICINE

## 2020-03-23 PROCEDURE — 85610 PROTHROMBIN TIME: CPT | Performed by: INTERNAL MEDICINE

## 2020-03-23 RX ORDER — LEVOFLOXACIN 500 MG/1
500 TABLET, FILM COATED ORAL DAILY
COMMUNITY

## 2020-03-23 RX ORDER — ACYCLOVIR 800 MG/1
800 TABLET ORAL 2 TIMES DAILY
COMMUNITY

## 2020-03-23 ASSESSMENT — PAIN SCALES - GENERAL: PAINLEVEL: NO PAIN (0)

## 2020-03-23 ASSESSMENT — MIFFLIN-ST. JEOR: SCORE: 1731.02

## 2020-03-23 NOTE — PROGRESS NOTES
First Name: You  Age: 74 year old   Referring Physician: Dr. Mcleod   REASON FOR REFERRAL: Education and evaluation for tunneled catheter placement  Patient is undergoing w/u for autologous BMT, using his own stem cells as the donor     HPI:  This is a pt who went in for his annual follow-up in November 2019, and his blood work found abnormal counts, with circulating atypical plasma cells.  He did note that in the previous 6 months he felt more fatuged and had lost about 15 pounds.  Peripheral blood on 11/19/2019 had 41% atypical plasma cells.  Bone marrow biopsy on 11/20/2019 was 90% cellularity with diffuse and extensive infiltrate of plasma cells. FISH showed Gain 1q, Del 13q, T(14;16) consistent with high risk disease    Kappa light chains at diagnosis elevated at 11,055 and lambda light chains at 1.8.  Serum protein electrophoresis monoclonal peak 0.13.  Bone survey was negative.  MUGA showed an EF of 67%.  Started chemotherapy with weekly Cytoxan plus biweekly Velcade plus Decadron.  He is now undergoing work-up using his own stem cells as the donor.    LINE HX:  No previous central lines  PAST MEDICAL HISTORY:   Past Medical History:   Diagnosis Date     Depression      GERD (gastroesophageal reflux disease)      Hypertension      PTSD (post-traumatic stress disorder)      Spasm of esophagus      PAST SURGICAL HISTORY:   Past Surgical History:   Procedure Laterality Date     HERNIA REPAIR Bilateral 03/2004     laprrrrrrrrrrrrrrrrroscopic paraesophageal hernia with mesh & fondoplication  02/06/2017     MOHS surgery Left 07/31/2018    nasal sidewall     MOHS surgery Left 06/17/2019    posterior ear     FAMILY HISTORY:   Family History   Problem Relation Age of Onset     Cancer No family hx of      SOCIAL HISTORY:   Social History     Tobacco Use     Smoking status: Former Smoker     Packs/day: 1.00     Years: 45.00     Pack years: 45.00     Types: Cigarettes     Last attempt to quit: 3/23/2008     Years  since quittin.0     Smokeless tobacco: Never Used   Substance Use Topics     Alcohol use: Not Currently     Alcohol/week: 7.0 standard drinks     Types: 7 Cans of beer per week     PROBLEM LIST:   Patient Active Problem List    Diagnosis Date Noted     Plasma cell leukemia (H) 2020     Priority: Medium     History of basal cell carcinoma 2018     Priority: Medium     Left nasal sidewall, basal cell carcinoma, s/p Mohs 2018  Left nasal bridge, basal cell carcinoma, s/p Mohs 2015  Left nose, basal cell carcinoma, s/p Mohs 2015  Right nose, basal cell carcinoma, s/p Mohs 2012  Right lower leg, basal cell carcinoma, s/p excision 7/15/2011  Left posterior ear helix, basal cell carcinoma, s/p Mohs 19       Personal history of tobacco use, presenting hazards to health 2018     Priority: Medium     COPD, mild (H) 2018     Priority: Medium     Obstructive sleep apnea on CPAP 2017     Priority: Medium     Severe  Setting: APAP 5-15 (FFM)  Supplied by: Ernestina Wasserman  PSG done: 17  AHI 54  RDI 58  Lowest O2 Sat: 86%  Liliana/Monse  20 cmn/FFM  New 17 (faxed 17); 12/15/17 cmn/FFM, Renew 19       S/P laparoscopic hernia repair 2017     Priority: Medium     Laparoscopic paraesophageal hernia repair with mesh and Sukhi fundoplication       Umbilical hernia 2013     Priority: Medium     Orthostatic hypotension 2013     Priority: Medium     Esophageal spasm 2013     Priority: Medium     Essential hypertension 2009     Priority: Medium     Hypertension       MEDICATIONS:   Prescription Medications as of 3/23/2020       Rx Number Disp Refills Start End Last Dispensed Date Next Fill Date Owning Pharmacy    citalopram (CELEXA) 20 MG tablet            Sig: Take 30 mg by mouth daily     Class: Historical    Route: Oral    prazosin (MINIPRESS) 2 MG capsule    2011        Sig: Take 2 capsules by mouth At Bedtime     Class:  "Historical    Route: Oral    triamterene-HCTZ (MAXZIDE-25) 37.5-25 MG tablet            Sig: Take 0.5 tablets by mouth daily     Class: Historical    Route: Oral    acyclovir (ZOVIRAX) 800 MG tablet            Sig: Take 800 mg by mouth 2 times daily    Class: Historical    Route: Oral    albuterol (PROAIR HFA/PROVENTIL HFA/VENTOLIN HFA) 108 (90 Base) MCG/ACT inhaler    10/18/2018        Sig: Inhale 1-2 puffs into the lungs every 4 hours as needed for shortness of breath / dyspnea or wheezing     Class: Historical    Notes to Pharmacy: Pharmacy may dispense brand covered by insurance (Proair, or proventil or ventolin or generic albuterol inhaler)    Route: Inhalation    ketotifen (ZADITOR) 0.025 % ophthalmic solution            Sig: Place 1 drop into both eyes 2 times daily as needed for itching     Class: Historical    Route: Both Eyes    levofloxacin (LEVAQUIN) 500 MG tablet            Sig: Take 500 mg by mouth daily    Class: Historical    Route: Oral        ALLERGIES:  No Known Allergies  Vital Signs 3/23/2020   Systolic 130   Diastolic 80   Pulse 67   Temperature 97.4   Respirations 18   Weight (LB) 209 lb   Height 6' 0\"   BMI (Calculated) 28.35   Pain    O2 93     ROS:  Skin: negative  Musculoskeletal: negative  Neurologic: negative  Psychiatric: negative    Physical Examination: Vital signs are reviewed and they are stable  Constitutional: Pleasant, older gentleman, in no acute physical distress, came alone to his appt  Neck:  Neck supple. No adenopathy.  Musculoskeletal: extremities normal- no gross deformities noted, gait normal and normal muscle tone  Skin: no suspicious lesions or rashes  Neurologic: negative  Psychiatric: affect normal/bright and mentation appears normal.    RESULTS:  BMP RESULTS:  Lab Results   Component Value Date     03/23/2020    POTASSIUM 3.8 03/23/2020    CHLORIDE 107 03/23/2020    CO2 28 03/23/2020    ANIONGAP 4 03/23/2020    GLC 97 03/23/2020    BUN 12 03/23/2020    CR 0.67 " 03/23/2020    GFRESTIMATED >90 03/23/2020    GFRESTBLACK >90 03/23/2020    CY 9.3 03/23/2020        CBC RESULTS:  Lab Results   Component Value Date    WBC 5.3 03/23/2020    RBC 4.22 (L) 03/23/2020    HGB 13.3 03/23/2020    HCT 39.4 (L) 03/23/2020    MCV 93 03/23/2020    MCH 31.5 03/23/2020    MCHC 33.8 03/23/2020    RDW 15.9 (H) 03/23/2020     03/23/2020       INR/PTT:  Lab Results   Component Value Date    INR 1.04 03/23/2020    PTT 29 03/23/2020         ASSESSMENT/PLAN:  Type of catheter: Large bore double lumen tunneled apheresis capable catheter     Preferred Location: Right  Internal Jugular Vein     Platelet count is   Lab Results   Component Value Date     03/23/2020    , and coagulation factors are   Lab Results   Component Value Date    INR 1.04 03/23/2020    ,  Lab Results   Component Value Date    PTT 29 03/23/2020   . The patient is at low risk for bleeding during the procedure.    PROVIDER NOTE:  The tunneled catheter placement procedure and its risks including but not limited to bleeding, infection, fibrin sheath formation and blood clots was explained to Haja.    CONSENT: Affirmation of informed written consent was not obtained.     INSTRUCTIONS/GUIDELINES:   Eating and drinking restriction guidelines were reviewed., Anti-bacterial scrub was given and instructions for its use were reviewed to decrease the risk of infection on the day of the procedure., I explained the expected length of time the tunneled catheter could remain in place., I explained that when they went [facility] they would be taught about exit site dressing care, flushing of the lumens and how to care for the catheter when bathing., The role of Interventional Radiology was reviewed. and The principles of infection control were reviewed.     30 minutes was spent with Haja.  25 minutes was spent in counseling.  Zuri Baker MS, APRN, CNS, CRN  Clinical Nurse Specialist  Interventional  Radiology  [phone] (voice mail)  [phone] (pager)    CC  Patient Care Team:  Stefano Cole MD as PCP - General  Stefano Cole MD as Referring Physician (Internal Medicine-Hematology & Oncology)  STEFANO COLE

## 2020-03-23 NOTE — PROGRESS NOTES
Chief Complaint   Patient presents with     RECHECK     BMT work up for plasma cell leukemia,

## 2020-03-23 NOTE — LETTER
3/23/2020       RE: You Silva  [address] [facility] [address]     Dear Colleague,    Thank you for referring your patient, You Silva, to the [facility] INTERVENTIONAL RADIOLOGY at [facility]. Please see a copy of my visit note below.    First Name: You  Age: 74 year old   Referring Physician: Dr. Mcleod   REASON FOR REFERRAL: Education and evaluation for tunneled catheter placement  Patient is undergoing w/u for autologous BMT, using his own stem cells as the donor     HPI:  This is a pt who went in for his annual follow-up in November 2019, and his blood work found abnormal counts, with circulating atypical plasma cells.  He did note that in the previous 6 months he felt more fatuged and had lost about 15 pounds.  Peripheral blood on 11/19/2019 had 41% atypical plasma cells.  Bone marrow biopsy on 11/20/2019 was 90% cellularity with diffuse and extensive infiltrate of plasma cells. FISH showed Gain 1q, Del 13q, T(14;16) consistent with high risk disease    Kappa light chains at diagnosis elevated at 11,055 and lambda light chains at 1.8.  Serum protein electrophoresis monoclonal peak 0.13.  Bone survey was negative.  MUGA showed an EF of 67%.  Started chemotherapy with weekly Cytoxan plus biweekly Velcade plus Decadron.  He is now undergoing work-up using his own stem cells as the donor.    LINE HX:  No previous central lines  PAST MEDICAL HISTORY:   Past Medical History:   Diagnosis Date     Depression      GERD (gastroesophageal reflux disease)      Hypertension      PTSD (post-traumatic stress disorder)      Spasm of esophagus      PAST SURGICAL HISTORY:   Past Surgical History:   Procedure Laterality Date     HERNIA REPAIR Bilateral 03/2004     laprrrrrrrrrrrrrrrrroscopic paraesophageal hernia with mesh & fondoplication  02/06/2017     MOHS surgery Left 07/31/2018    nasal sidewall     MOHS surgery Left 06/17/2019    posterior ear     FAMILY HISTORY:    Family History   Problem Relation Age of Onset     Cancer No family hx of      SOCIAL HISTORY:   Social History     Tobacco Use     Smoking status: Former Smoker     Packs/day: 1.00     Years: 45.00     Pack years: 45.00     Types: Cigarettes     Last attempt to quit: 3/23/2008     Years since quittin.0     Smokeless tobacco: Never Used   Substance Use Topics     Alcohol use: Not Currently     Alcohol/week: 7.0 standard drinks     Types: 7 Cans of beer per week     PROBLEM LIST:   Patient Active Problem List    Diagnosis Date Noted     Plasma cell leukemia (H) 2020     Priority: Medium     History of basal cell carcinoma 2018     Priority: Medium     Left nasal sidewall, basal cell carcinoma, s/p Mohs 2018  Left nasal bridge, basal cell carcinoma, s/p Mohs 2015  Left nose, basal cell carcinoma, s/p Mohs 2015  Right nose, basal cell carcinoma, s/p Mohs 2012  Right lower leg, basal cell carcinoma, s/p excision 7/15/2011  Left posterior ear helix, basal cell carcinoma, s/p Mohs 19       Personal history of tobacco use, presenting hazards to health 2018     Priority: Medium     COPD, mild (H) 2018     Priority: Medium     Obstructive sleep apnea on CPAP 2017     Priority: Medium     Severe  Setting: APAP 5-15 (FFM)  Supplied by: Ernestina Wasserman  PSG done: 17  AHI 54  RDI 58  Lowest O2 Sat: 86%  Liliana/Monse  20 cmn/FFM  New 17 (faxed 17); 12/15/17 cmn/FFM, Renew 19       S/P laparoscopic hernia repair 2017     Priority: Medium     Laparoscopic paraesophageal hernia repair with mesh and Sukhi fundoplication       Umbilical hernia 2013     Priority: Medium     Orthostatic hypotension 2013     Priority: Medium     Esophageal spasm 2013     Priority: Medium     Essential hypertension 2009     Priority: Medium     Hypertension       MEDICATIONS:   Prescription Medications as of 3/23/2020       Rx Number Disp Refills  "Start End Last Dispensed Date Next Fill Date Owning Pharmacy    citalopram (CELEXA) 20 MG tablet            Sig: Take 30 mg by mouth daily     Class: Historical    Route: Oral    prazosin (MINIPRESS) 2 MG capsule    6/8/2011        Sig: Take 2 capsules by mouth At Bedtime     Class: Historical    Route: Oral    triamterene-HCTZ (MAXZIDE-25) 37.5-25 MG tablet            Sig: Take 0.5 tablets by mouth daily     Class: Historical    Route: Oral    acyclovir (ZOVIRAX) 800 MG tablet            Sig: Take 800 mg by mouth 2 times daily    Class: Historical    Route: Oral    albuterol (PROAIR HFA/PROVENTIL HFA/VENTOLIN HFA) 108 (90 Base) MCG/ACT inhaler    10/18/2018        Sig: Inhale 1-2 puffs into the lungs every 4 hours as needed for shortness of breath / dyspnea or wheezing     Class: Historical    Notes to Pharmacy: Pharmacy may dispense brand covered by insurance (Proair, or proventil or ventolin or generic albuterol inhaler)    Route: Inhalation    ketotifen (ZADITOR) 0.025 % ophthalmic solution            Sig: Place 1 drop into both eyes 2 times daily as needed for itching     Class: Historical    Route: Both Eyes    levofloxacin (LEVAQUIN) 500 MG tablet            Sig: Take 500 mg by mouth daily    Class: Historical    Route: Oral        ALLERGIES:  No Known Allergies  Vital Signs 3/23/2020   Systolic 130   Diastolic 80   Pulse 67   Temperature 97.4   Respirations 18   Weight (LB) 209 lb   Height 6' 0\"   BMI (Calculated) 28.35   Pain    O2 93     ROS:  Skin: negative  Musculoskeletal: negative  Neurologic: negative  Psychiatric: negative    Physical Examination: Vital signs are reviewed and they are stable  Constitutional: Pleasant, older gentleman, in no acute physical distress, came alone to his appt  Neck:  Neck supple. No adenopathy.  Musculoskeletal: extremities normal- no gross deformities noted, gait normal and normal muscle tone  Skin: no suspicious lesions or rashes  Neurologic: negative  Psychiatric: " affect normal/bright and mentation appears normal.    RESULTS:  BMP RESULTS:  Lab Results   Component Value Date     03/23/2020    POTASSIUM 3.8 03/23/2020    CHLORIDE 107 03/23/2020    CO2 28 03/23/2020    ANIONGAP 4 03/23/2020    GLC 97 03/23/2020    BUN 12 03/23/2020    CR 0.67 03/23/2020    GFRESTIMATED >90 03/23/2020    GFRESTBLACK >90 03/23/2020    CY 9.3 03/23/2020        CBC RESULTS:  Lab Results   Component Value Date    WBC 5.3 03/23/2020    RBC 4.22 (L) 03/23/2020    HGB 13.3 03/23/2020    HCT 39.4 (L) 03/23/2020    MCV 93 03/23/2020    MCH 31.5 03/23/2020    MCHC 33.8 03/23/2020    RDW 15.9 (H) 03/23/2020     03/23/2020       INR/PTT:  Lab Results   Component Value Date    INR 1.04 03/23/2020    PTT 29 03/23/2020         ASSESSMENT/PLAN:  Type of catheter: Large bore double lumen tunneled apheresis capable catheter     Preferred Location: Right  Internal Jugular Vein     Platelet count is   Lab Results   Component Value Date     03/23/2020    , and coagulation factors are   Lab Results   Component Value Date    INR 1.04 03/23/2020    ,  Lab Results   Component Value Date    PTT 29 03/23/2020   . The patient is at low risk for bleeding during the procedure.    PROVIDER NOTE:  The tunneled catheter placement procedure and its risks including but not limited to bleeding, infection, fibrin sheath formation and blood clots was explained to Haja.    CONSENT: Affirmation of informed written consent was not obtained.     INSTRUCTIONS/GUIDELINES:   Eating and drinking restriction guidelines were reviewed., Anti-bacterial scrub was given and instructions for its use were reviewed to decrease the risk of infection on the day of the procedure., I explained the expected length of time the tunneled catheter could remain in place., I explained that when they went to the [facility] they would be taught about exit site dressing care, flushing of the lumens and how to care for the catheter  when bathing., The role of Interventional Radiology was reviewed. and The principles of infection control were reviewed.     30 minutes was spent with Haja.  25 minutes was spent in counseling.  Zuri Baker MS, APRN, CNS, CRN  Clinical Nurse Specialist  Interventional Radiology  [phone] (voice mail)  [phone] (pager)    CC  Patient Care Team:  Stefano Mcleod MD as PCP - General

## 2020-03-23 NOTE — NURSING NOTE
2BMT Teaching Flowsheet    You Silva is a 74 year old male  Diagnoses of Plasma cell leukemia in remission (H) and Personal history of diseases of blood and blood-forming organs were pertinent to this visit.    Teaching Topic: bmt work up for plasma cell leukemia    Person(s) involved in teaching: Patient  Motivation Level  Asks Questions: Yes  Eager to Learn: Yes  Cooperative: Yes  Receptive (willing/able to accept information): Yes  Any cultural factors/Amish beliefs that may influence understanding or compliance? No    Patient demonstrates understanding of the following:  - Reason for the appointment, diagnosis and treatment plan: Yes  - Knowledge of proper use of medications and conditions for which they are ordered (with special attention to potential side effects or drug interactions): Yes  - Which situations necessitate calling provider and whom to contact: Yes    Teaching concerns addressed: signed consents, reviewed and updated med list allergy assessment, smoking assessment, reviewed bmt work up calendar including discussion of all tests and procedures, labs drawn by ?RN, venipuncture, ua collected, 24 hr urine collection kit given to pt who states he will start collection today and return it tomorrow,     Proper use and care of (medical equipment, care aids, etc.) Yes  Pain management techniques: Yes  Patient instructed on hand hygiene: Yes  How and/when to access community resources: Yes    Infection Control:  Patient demonstrates understanding of the following:  Surgical procedure site care taught NA  Signs and symptoms of infection taught NA  Wound care taught NA  Central venous catheter care taught NA    Instructional Materials Used/Given:   bmt work up calendar and 24 hr urine kit . For  [facility] patient wallet card given. Patient instructed to remain within close proximity (at least two hours) for at least four weeks post infusion.    Time spent with patient: 45  minutes.    Specific Concerns: No, explain:

## 2020-03-23 NOTE — PROGRESS NOTES
"Pharmacy Assessment - Pre-Stem Cell Transplant    Assessments & Recommendations:  1) Patient with prolonged QTc interval, avoid prolonging agents if possible.  2) [facility], [facility].  3) 24 hour measures CrCl pending      If this patient is admitted under observation (for Cytoxan stem cell mobilization), the patient may bring in their own supply of the following medication for use in the hospital:  1) Albuterol inhaler  2) Ketotifen eye drops  -Per \"Medications Not Supplied by Pharmacy\" policy:  [email or web address]    History of Present Illness:  You Silva is a 74 year old year old male diagnosed with plasma cell leukemia.  He has been treated with CyBorD.  He is now being work up for autologous Stem Cell Transplant on protocol 2016-35, which utilizes Melphalan as a conditioning regimen.    Pertinent labs/tests:  Viral Testing:  pending  Ejection Fraction: pending  QTc: 490msec (3/23/2020)    Weights:   Wt Readings from Last 3 Encounters:   03/23/20 94.8 kg (209 lb)   02/27/20 97.6 kg (215 lb 1.6 oz)   Ideal body weight: 77.6 kg (171 lb 1.2 oz)  Adjusted ideal body weight: 84.5 kg (186 lb 3.9 oz)  % IBW:  122%  There is no height or weight on file to calculate BMI.    Primary BMT Physician: Dr Maradiaga  BMT RN Coordinator:  Sadia Newman    Past Medical History:  No past medical history on file.    Medication Allergies:  No Known Allergies    Current Medications (pre-admit):  Current Outpatient Medications   Medication Sig Dispense Refill     acyclovir (ZOVIRAX) 800 MG tablet Take 800 mg by mouth 2 times daily       levofloxacin (LEVAQUIN) 500 MG tablet Take 500 mg by mouth daily       albuterol (PROAIR HFA/PROVENTIL HFA/VENTOLIN HFA) 108 (90 Base) MCG/ACT inhaler Inhale 1-2 puffs into the lungs every 4 hours as needed for shortness of breath / dyspnea or wheezing        citalopram (CELEXA) 20 " MG tablet Take 30 mg by mouth daily        ketotifen (ZADITOR) 0.025 % ophthalmic solution Place 1 drop into both eyes 2 times daily as needed for itching        prazosin (MINIPRESS) 2 MG capsule Take 2 capsules by mouth At Bedtime        triamterene-HCTZ (MAXZIDE-25) 37.5-25 MG tablet Take 0.5 tablets by mouth daily          Herbal Medication/Nutritional Supplements:  None reported    Smoking/Past Drug Use:  Did not discuss    Nausea/Vomiting, Pain, or other issues:  Patient reports no nausea in the past.  Patient denies pain and is not currently taking pain medications for breakthrough relief.      Summary:  I met with You Silva  for approximately 30 minutes.  We discussed his current medications, transplant chemotherapy, risk of infection/prophylactic antibiotics, and supportive care. We also reviewed allergies, future vaccinations, and pharmacy expectations post transplant. Haja participated in our conversation and voiced his understanding of the topics discussed. Thank you for allowing me to participate in the care of this patient.    Mehnaz Daniel, DianaD

## 2020-03-24 ENCOUNTER — OFFICE VISIT (OUTPATIENT)
Dept: EDUCATION SERVICES | Facility: CLINIC | Age: 75
End: 2020-03-24
Attending: INTERNAL MEDICINE
Payer: COMMERCIAL

## 2020-03-24 ENCOUNTER — VIRTUAL VISIT (OUTPATIENT)
Dept: TRANSPLANT | Facility: CLINIC | Age: 75
End: 2020-03-24
Attending: INTERNAL MEDICINE
Payer: COMMERCIAL

## 2020-03-24 ENCOUNTER — HOSPITAL ENCOUNTER (OUTPATIENT)
Dept: LAB | Facility: CLINIC | Age: 75
Discharge: HOME | End: 2020-03-24
Attending: INTERNAL MEDICINE | Admitting: INTERNAL MEDICINE
Payer: COMMERCIAL

## 2020-03-24 VITALS
HEART RATE: 79 BPM | SYSTOLIC BLOOD PRESSURE: 116 MMHG | BODY MASS INDEX: 28.55 KG/M2 | WEIGHT: 210.76 LBS | DIASTOLIC BLOOD PRESSURE: 72 MMHG | TEMPERATURE: 98.1 F | HEIGHT: 72 IN | RESPIRATION RATE: 18 BRPM

## 2020-03-24 DIAGNOSIS — C90.11 PLASMA CELL LEUKEMIA IN REMISSION (H): ICD-10-CM

## 2020-03-24 DIAGNOSIS — Z86.2 PERSONAL HISTORY OF DISEASES OF BLOOD AND BLOOD-FORMING ORGANS: ICD-10-CM

## 2020-03-24 DIAGNOSIS — C90.11 PLASMA CELL LEUKEMIA IN REMISSION (H): Primary | ICD-10-CM

## 2020-03-24 DIAGNOSIS — Z71.9 VISIT FOR COUNSELING: Primary | ICD-10-CM

## 2020-03-24 LAB
ALBUMIN SERPL ELPH-MCNC: 4.1 G/DL (ref 3.7–5.1)
ALPHA1 GLOB SERPL ELPH-MCNC: 0.3 G/DL (ref 0.2–0.4)
ALPHA2 GLOB SERPL ELPH-MCNC: 0.7 G/DL (ref 0.5–0.9)
B-GLOBULIN SERPL ELPH-MCNC: 0.6 G/DL (ref 0.6–1)
BACTERIA SPEC CULT: NO GROWTH
COLLECT DURATION TIME UR: 22 H
CREAT 24H UR-MRATE: 1.52 G/(24.H) (ref 1–2)
CREAT CL 24H UR+SERPL-VRATE: 157 ML/MIN
CREAT CL/1.73 SQ M 24H UR+SERPL-ARVRAT: 125 ML/MIN/1.7M2 (ref 110–180)
CREAT SERPL-MCNC: 0.67 MG/DL (ref 0.66–1.25)
CREAT UR-MCNC: 121 MG/DL
DEPRECATED CALCIDIOL+CALCIFEROL SERPL-MC: <60 UG/L (ref 20–75)
GAMMA GLOB SERPL ELPH-MCNC: 0.3 G/DL (ref 0.7–1.6)
HEIGHT IN CM: 183 CM
IGA SERPL-MCNC: 10 MG/DL (ref 84–499)
IGG SERPL-MCNC: 260 MG/DL (ref 610–1616)
IGM SERPL-MCNC: <10 MG/DL (ref 35–242)
INTERPRETATION ECG - MUSE: NORMAL
LAB SCANNED RESULT: NORMAL
Lab: NORMAL
M PROTEIN SERPL ELPH-MCNC: 0.1 G/DL
PROT 24H UR-MRATE: 0.08 G/(24.H) (ref 0.04–0.23)
PROT PATTERN SERPL ELPH-IMP: ABNORMAL
PROT PATTERN SERPL IFE-IMP: ABNORMAL
PROT UR-MCNC: 0.07 G/L
PROT/CREAT 24H UR: 0.05 G/G CR (ref 0–0.2)
SPECIMEN SOURCE: NORMAL
SPECIMEN VOL UR: 1150 ML
VITAMIN D2 SERPL-MCNC: <5 UG/L
VITAMIN D3 SERPL-MCNC: 55 UG/L

## 2020-03-24 PROCEDURE — G0463 HOSPITAL OUTPT CLINIC VISIT: HCPCS | Mod: ZF

## 2020-03-24 ASSESSMENT — MIFFLIN-ST. JEOR: SCORE: 1739.13

## 2020-03-24 NOTE — CONSULTS
"APHERESIS INITIAL CONSULT CHECKLIST    Current Encounter Information  Current Encounter Information: Reason for Visit, Allergies and Current Meds  Procedure Requested: MNC/PBSC Collection  History of: (Reason for Apheresis): Plasma Cell Leukemia    Access Assessment  Access Assessment  Catheter Assessment: BTM will schedule for CVC line placement  Needs a catheter placed for Apheresis?: Yes, transfusion medicine physician informed.    Vital Signs  Vital Signs  BP: 116/72  Pulse: 79  Temp: 98.1  F (36.7  C)  Temp src: Oral  Resp: 18  Height: 182.9 cm (6' 0.01\")  Weight: 95.6 kg (210 lb 12.2 oz)    Reviewed   Review With Patient  Have you read the brochure Getting ready for Apheresis?: Yes  Have you had any invasive procedures, surgery, biopsy, bleeding in the last month?: No  Review medications and allergies: Yes  Have you ever been transfused?: No  Do you require pre-medication for blood products?: No  Patient given tour of the unit: Yes  Photophoresis: sun precautions reviewed with patient: N/A    Additional Information  Notes, needs and time spent with patient  Explain procedure, side effects or reactions, instructions: Yes  Patient has special need?: No  Time spent: 30 mins face to face contact    Pt arrived ambulatory with his wife; feels good today; VSS; nkda,meds reviewed; BMT will schedule for CVC line placement; Pt was a member of [employer] , stationed in [place] 1969-70, [place] 1970-71, [place] 1986-87;  tour and [place] tour, more than a year ago. NO travel deferral per SOP. Procedure explained with emphasis about low fat diet, duration, side effects,comfort and hydration. Questions were answered and understood. Dr. Snowden met with them for consult and consent.     "

## 2020-03-24 NOTE — CONSULTS
Transfusion Medicine Consultation    You Silva MRN# [id number]   YOB: 1945 Age: 74 year old   Date of consult: 3/24/2020     Reason for consult: Autologous PBSC collection           Assessment and Plan:   The patient is a 74 year old male with plasma cell leukemia/multiple myeloma who presents for consultation for autologous PBSC collection.  He is otherwise relatively healthy (see PMH below).  The plan is to collect for 1 to 3 days until the target goal is met.  The patient will have a CVC line placed.  His care is coordinated with BMT through the [facility].  We will move forward with plan for collection as per BMT.     Attestation:  I met with the patient and his wife and discussed the collection procedure, including risks, benefits. The patient and his wife had a few questions, and I answered them to the best of my ability.  The patient agreed to proceed with the plan for autologous PBSC collection and signed the consent forms.  We will proceed as per BMT.             Chief Complaint:   Transfusion medicine consultation.         History of Present Illness:   The patient is a 74 year old male with plasma cell leukemia/multiple myeloma who presents for consultation for autologous PBSC collection.  His past medical history includes HTN, mild COPD, AMRITA (on CPAP), depression/PTSD, GERD (see below for complete list).  He is currently well.  The patient denies any back pain that would prevent him from tolerating the procedure, and no allergies to latex or any other drug allergies.  The patient confirms a recent flu vaccination.  The travel history is extensive ( and vacations), but no travel deferral (see apheresis RN consult note) .  The patient has no identifiable risk factors for infectious disease.  The procedure, risks/benefits were discussed with the patient, and I answered them to the best of my ability.             Past Medical History:     Past Medical History:   Diagnosis Date      Depression      GERD (gastroesophageal reflux disease)      Hypertension      PTSD (post-traumatic stress disorder)      Spasm of esophagus    COPD, mild  AMRITA, on CPAP  Basal cell carcinomas         Past Surgical History:     Past Surgical History:   Procedure Laterality Date     HERNIA REPAIR Bilateral 2004     laprrrrrrrrrrrrrrrrroscopic paraesophageal hernia with mesh & fondoplication  2017     MOHS surgery Left 2018    nasal sidewall     MOHS surgery Left 2019    posterior ear          Social History:     Social History     Socioeconomic History     Marital status:      Spouse name: Not on file     Number of children: Not on file     Years of education: Not on file     Highest education level: Not on file   Occupational History     Not on file   Social Needs     Financial resource strain: Not on file     Food insecurity     Worry: Not on file     Inability: Not on file     Transportation needs     Medical: Not on file     Non-medical: Not on file   Tobacco Use     Smoking status: Former Smoker     Packs/day: 1.00     Years: 45.00     Pack years: 45.00     Types: Cigarettes     Last attempt to quit: 3/23/2008     Years since quittin.0     Smokeless tobacco: Never Used   Substance and Sexual Activity     Alcohol use: Not Currently     Alcohol/week: 7.0 standard drinks     Types: 7 Cans of beer per week     Drug use: Not Currently     Sexual activity: Not Currently   Lifestyle     Physical activity     Days per week: Not on file     Minutes per session: Not on file     Stress: Not on file   Relationships     Social connections     Talks on phone: Not on file     Gets together: Not on file     Attends [employer]ism service: Not on file     Active member of club or organization: Not on file     Attends meetings of clubs or organizations: Not on file     Relationship status: Not on file     Intimate partner violence     Fear of current or ex partner: Not on file     Emotionally abused: Not  "on file     Physically abused: Not on file     Forced sexual activity: Not on file   Other Topics Concern     Not on file   Social History Narrative     Not on file               Family History:     Family History   Problem Relation Age of Onset     Cancer No family hx of                Immunizations:     Most Recent Immunizations   Administered Date(s) Administered     DTaP, Unspecified 03/09/2019     Flu 65+ Years 12/05/2018     Flu, Unspecified 11/11/2019     Influenza (High Dose) 3 valent vaccine 10/06/2019     Influenza (IIV3) PF 09/16/2014     Pneumococcal (PCV 7) 11/24/2015     Td (Adult), Adsorbed 08/19/2009             Allergies:    No Known Allergies          Medications:     Current Outpatient Medications   Medication     acyclovir (ZOVIRAX) 800 MG tablet     albuterol (PROAIR HFA/PROVENTIL HFA/VENTOLIN HFA) 108 (90 Base) MCG/ACT inhaler     citalopram (CELEXA) 20 MG tablet     ketotifen (ZADITOR) 0.025 % ophthalmic solution     levofloxacin (LEVAQUIN) 500 MG tablet     prazosin (MINIPRESS) 2 MG capsule     triamterene-HCTZ (MAXZIDE-25) 37.5-25 MG tablet     No current facility-administered medications for this encounter.           Review of Systems:   General: fells well today, no recent illnesses.           Vital Signs:     Vitals:    03/24/20 0847   BP: 116/72   Pulse: 79   Resp: 18   Temp: 98.1  F (36.7  C)   TempSrc: Oral   Weight: 95.6 kg (210 lb 12.2 oz)   Height: 1.829 m (6' 0.01\")               Data:      Blood type Rh(D)   A RH(D)   Date Value Ref Range Status   03/23/2020 Neg  Final         Last CBC:  Lab Results   Component Value Date    WBC 5.3 03/23/2020    HGB 13.3 03/23/2020    HCT 39.4 (L) 03/23/2020    MCV 93 03/23/2020     03/23/2020     Attestation:   I met with the patient and his wife and discussed the collection procedure, including risks, benefits. The patient and his wife had a few questions, and I answered them to the best of my ability.  The patient agreed to proceed with " the plan for autolgous PBSC collection and signed the consent forms.  We will proceed as per BMT.     Spencer Stockton M.D.  Professor, Transfusion Medicine  Laboratory Medicine & Pathology  Pager: [phone]

## 2020-03-25 ENCOUNTER — HOSPITAL ENCOUNTER (OUTPATIENT)
Dept: PET IMAGING | Facility: CLINIC | Age: 75
Setting detail: NUCLEAR MEDICINE
Discharge: HOME | End: 2020-03-25
Attending: INTERNAL MEDICINE | Admitting: INTERNAL MEDICINE
Payer: COMMERCIAL

## 2020-03-25 ENCOUNTER — ANCILLARY PROCEDURE (OUTPATIENT)
Dept: CARDIOLOGY | Facility: CLINIC | Age: 75
End: 2020-03-25
Attending: INTERNAL MEDICINE
Payer: MEDICARE

## 2020-03-25 ENCOUNTER — ANCILLARY PROCEDURE (OUTPATIENT)
Dept: GENERAL RADIOLOGY | Facility: CLINIC | Age: 75
End: 2020-03-25
Attending: INTERNAL MEDICINE
Payer: COMMERCIAL

## 2020-03-25 DIAGNOSIS — C90.11 PLASMA CELL LEUKEMIA IN REMISSION (H): ICD-10-CM

## 2020-03-25 DIAGNOSIS — Z86.2 PERSONAL HISTORY OF DISEASES OF BLOOD AND BLOOD-FORMING ORGANS: ICD-10-CM

## 2020-03-25 LAB
A* LOCUS: NORMAL
A*: NORMAL
ABTEST METHOD: NORMAL
B* LOCUS: NORMAL
B*: NORMAL
BW-1: NORMAL
C* LOCUS: NORMAL
C*: NORMAL
DONOR CYTOMEGALOVIRUS ABY: POSITIVE
DONOR HEP B CORE ABY: NONREACTIVE
DONOR HEP B SURF AGN: NONREACTIVE
DONOR HEPATITIS C ABY: NONREACTIVE
DONOR HTLV 1&2 ANTIBODY: NONREACTIVE
DONOR TREPONEMA PAL ABY: NONREACTIVE
DPA1* NMDP: NORMAL
DPA1*: NORMAL
DPB1* LOCUS NMDP: NORMAL
DPB1* NMDP: NORMAL
DPB1*: NORMAL
DPB1*LOCUS: NORMAL
DQA1*LOCUS: NORMAL
DQB1* LOCUS: NORMAL
DRB1* LOCUS: NORMAL
DRB1*: NORMAL
DRB5* LOCUS: NORMAL
DRSSO TEST METHOD: NORMAL
HIV1+2 AB SERPL QL IA: NONREACTIVE
MPX SERIES: NONREACTIVE
PROT ELPH PNL UR ELPH: NORMAL
PROT PATTERN UR ELPH-IMP: NORMAL
T CRUZI AB SER DONR QL: NONREACTIVE
WNV RNA SPEC QL NAA+PROBE: NONREACTIVE

## 2020-03-25 PROCEDURE — 78816 PET IMAGE W/CT FULL BODY: CPT | Mod: PI

## 2020-03-25 PROCEDURE — A9552 F18 FDG: HCPCS | Performed by: INTERNAL MEDICINE

## 2020-03-25 PROCEDURE — 34300033 ZZH RX 343: Performed by: INTERNAL MEDICINE

## 2020-03-25 RX ADMIN — FLUDEOXYGLUCOSE F-18 11.82 MCI.: 500 INJECTION, SOLUTION INTRAVENOUS at 13:00

## 2020-03-25 ASSESSMENT — ANXIETY QUESTIONNAIRES
IF YOU CHECKED OFF ANY PROBLEMS ON THIS QUESTIONNAIRE, HOW DIFFICULT HAVE THESE PROBLEMS MADE IT FOR YOU TO DO YOUR WORK, TAKE CARE OF THINGS AT HOME, OR GET ALONG WITH OTHER PEOPLE: NOT DIFFICULT AT ALL
GAD7 TOTAL SCORE: 2
1. FEELING NERVOUS, ANXIOUS, OR ON EDGE: NOT AT ALL
6. BECOMING EASILY ANNOYED OR IRRITABLE: NOT AT ALL
5. BEING SO RESTLESS THAT IT IS HARD TO SIT STILL: SEVERAL DAYS
7. FEELING AFRAID AS IF SOMETHING AWFUL MIGHT HAPPEN: NOT AT ALL
2. NOT BEING ABLE TO STOP OR CONTROL WORRYING: NOT AT ALL
3. WORRYING TOO MUCH ABOUT DIFFERENT THINGS: SEVERAL DAYS

## 2020-03-25 ASSESSMENT — PATIENT HEALTH QUESTIONNAIRE - PHQ9
5. POOR APPETITE OR OVEREATING: NOT AT ALL
SUM OF ALL RESPONSES TO PHQ QUESTIONS 1-9: 2

## 2020-03-25 NOTE — PROGRESS NOTES
"CLINICAL SOCIAL WORK   PSYCHOSOCIAL ASSESSMENT  BLOOD AND MARROW TRANSPLANT SERVICE      Assessment completed on March 24, 2020  of living situation, support system, financial status, functional status, coping, stressors, need for resources and social work intervention provided as needed.  Information for this assessment was provided by Pt report in addition to medical chart review and consultation with medical team. Assessment took place via phone.    Present at assessment: PatientYou \"Haja\" was present for this assessment conducted by LOURDES Hart .     Diagnosis: Plasma Cell Leukemia    Date of Diagnosis: 11/20/2019    Transplant type: Autologous    Donor: Autologous     Physician: Betty Maradiaga MD    Nurse Coordinator: Sadia Newman RN    : MARIEL Hart, LICLAURA - Work-up                             MARIEL Schuler LICSW- post work-up    Permanent Address:   [address] [place] [id number]    Contact Information:  Pt Home Phone: [phone]  Pt Cell Phone: [phone]  Pt Email: [email or web address]  Pt's wife Kasia's Phone: [phone]    Presenting Information:  Haja is a 74 year old male diagnosed with Plasma Cell Leukemia who presents for evaluation for autologous transplant at the [facility] ([facility]     Decision Making:   Self     Health Care Directive:   [facility] and the pt will bring one in for scanning into his EMR.    Relationship Status:    to his wife Kasia. He reports their relationship as stable and supportive.    Special Needs: None identified at this time.     Family/Support System: Pt endorsed a good support system including family and close friends who will be available to support Pt throughout transplant process.     Spouse: Kasia Silva    Children: 2 biological children Shanique- pt has not contact with them  2 Step-children, Toñito and Betty, both are very " supportive of the pt.    Grandchildren: 2 grandkids (Toñito's)- 5 year old twins.    Parents:     Siblings:     Friends: some close friends who are supportive    Caregiver: SW discussed with pt the caregiver role and expectation at length. Pt is agreeable to having a full time caregiver for a minimum of 30 days until cleared by the BMT physician. Pt's identified caregivers are his wife Kasia and step-kids. Pt signed the caregiver contract which will be scanned into the EMR. Caregiver education and resources provided. No caregiver concerns identified. Pt and Pt's wife confirmed understanding caregiving requirement, including driving restrictions, as discussed during psychosocial assessment.     Name & Numbers  Kasia Silva    Transportation Mode:  Private Car . Pt is aware of driving restrictions post-BMT and the need for the caregiver is to drive until cleared to drive by the  BMT physician. SW provided information on parking info and monthly parking pass options. Pt will utilize the [employer] security shuttle for transportation to and from the [facility] and BMT Clinic/Hospital.    Insurance:  No Insurance issues identified.  Pt denied specific insurance concerns at this time. SW reiterated information about the BMT Financial  should specific insurance questions arise as Pt moves through transplant process.     Sources of Income:  Income concerns identified  The pt is supported by savings, social security and pension. The pt does endorse some financial concerns, but nothing specific at this time. SW encouraged Pt to contact this SW for additional potential resources should financial situation change.     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 [facility].    Mental Health: No mental health issues identified       PHQ-9 assessment, score was 2 ,which indicates no concerns of depression.  GAD7 assessment, score was 2, which indicates no concerns of anxiety.     We talked about how some patients may see an increase in feelings of anxiety or depression while hospitalized for extended periods along with isolation. Encouraged Haja and Kasia to let us know if they are noticing an increase in symptoms. We talked about the variety of modalities available to use as coping mechanisms (including but not limited to guided imagery, relaxation techniques, progressive muscle relaxation, counseling/talk therapy and medication).    The pt does not that he has been diagnosed with PTSD. He shared that this is from his time in the service and find triggers in topics around [place],  moves and news. [facility] and [facility] him every 3 months as this is a new provider for him, but was going every 6 months prior. He feels his PTSD is well controlled at this time.    Chemical Use: No issues identified.  The pt denies the use of tobacco, alcohol, marijuana or other drugs.  Based on the information provided, there appear to be no specific risks or concerns identified at this time.     Trauma/Loss/Abuse History: Multiple losses associated with cancer diagnosis and treatment, including health, employment, changes to physical appearance, etc.  The pt also discusses lots of trauma associated to his time in the service.     Spirituality:  Patient does not identify with su community. He reports he is spiritual, but does not identify with a religous community.     Coping: Pt noted that he is currently feeling \"positive, nervous, and ready to begin\".  Pt shared that his main coping mechanisms are talking with Kasia, and reading books.  Pt noted that he also bucky by taking naps. SW and Pt discussed additional positive coping mechanisms that Pt can utilize while " in the hospital.     Education Provided: Transplant process expectations, Caregiver requirements, Caregiver self-care, Financial issues related to transplant, Financial resources/grants available, Common psychosocial stressors pre/post transplant, Support group(s) available, Tour/layout of the inpatient unit/non-use of cell phones, Hospital resources available, Web site information, Resources for transplant patients and their families as well as the Clinical Social Work role.     Interventions Provided: Supportive counseling and education     Recreation/Leisure Activities:  Haja shared that he enjoys gardening and doing things around the house.    Plans for Hospital Stay Leisure:  While IP he plans to read and watch TV.    Assessment and Recommendations for Team:  Pt is a 74 year old male diagnosed with Plasma Cell Leukemia who is here undergoing preparation for a planned autologous transplant.     Pt is a pleasant and well articulate male who feels comfortable communicating with the medical team. Pt has a good support team who are involved. The pt did discuss that he is uncertain if he wants to move forward with transplant at this time due to the COVID-19 restrictions. Encouraged the pt and Kasia to talk to his MD about this, they plan to do this at his close.     Pt may benefit from ongoing psychosocial support in regards to coping with the adjustment to the BMT process. Pt's family may benefit from ongoing psychosocial support in regards to coping with the adjustment to the BMT process and may also benefit from attending the BMT Caregiver Support Group that meets weekly on the inpatient unit.     Pt has a good support system and a good caregiver plan. Pt verbalizes understanding of the transplant process and wanting to proceed. SW provided contact information and encouraged Pt to contact SW with questions, concerns, resources and for support. Per this assessment, I did not identify any barriers to this patient  moving forward with transplant      Important Information:   - The pt will bring in his HCD for scanning.     Follow up Planned:   Psychosocial support    MARIEL Hart, [facility]  Pager: [phone]  Phone: [phone]

## 2020-03-25 NOTE — PROGRESS NOTES
BMT Clinic Visit  Mar 26, 2020      Reason for Visit: review of work-up testing and consent signing    Disease and Treatment History:  1.  Presented to his primary care doc for annual follow-up and was found to have abnormal blood counts in November 2019 with circulating atypical plasma cells.  Noted 6 months of preceding increasing fatigue and weight loss of about 15 pounds  2. Peripheral blood on 11/19/2019 had 41% atypical plasma cells.  -Bone marrow biopsy on 11/20/2019 was 90% cellularity with diffuse and extensive infiltrate of plasma cells. FISH showed Gain 1q, Del 13q, T(14;16) consistent with high risk disease   -  kappa light chains at diagnosis elevated at 11,055 and lambda light chains at 1.8.  Serum protein electrophoresis monoclonal peak 0.13  -Bone survey on 11/25/2019 was negative   -MUGA showed an EF of 67%  3. Initiated on weekly Cytoxan plus biweekly Velcade plus Decadron.  Cycle 1 was on 11/20/20192.   -Initial great response with kappa light chains down to 252 as of the end of November  -Cycle 2 (12/23/2019) adjusted to standard dose Cytoxan and Velcade and dexamethasone weekly   - Cycle 3 began on January 22, 2020  -  Cycle 4 began on 2/19/2020 - 3/9/20      HPI: Haja is here to sign consents. Today he notes that he is feeling good. No current fevers or chills, no chest pain or SOB, no nausea or vomiting, no diarrhea, no bleeding or bruising. Appetite and energy good. No neuropathy. Did have a cap break off his tooth but no pain and no other dental issues.     10 point review of systems otherwise negative.      Past medical history:  1.  Plasma cell leukemia see HPI  2.  History of mild COPD  3.  History of esophageal spasm and hiatal hernia  4.  History of hypertension  5.  History of obstructive sleep apnea using a CPAP machine  6.  History of PTSD  7.  History of major depressive disorder  8.  History of numerous basal cell carcinomas status post numerous both surgery    No other diabetes,  liver, kidney, cardiac issues    Medications include Bactrim, acyclovir,      Physical Exam:  BP (!) 151/92   Pulse 80   Temp 96.3  F (35.7  C) (Oral)   Resp 18   Wt 95 kg (209 lb 8 oz)   SpO2 96%   BMI 28.41 kg/m    Gen: WEll appearing. KPS 90  HEENT: one broken off tooth but no dental inflammation  Lungs: CTAB no crackles or wheezes  CV: RRR  Abd: soft  Ext: no edema    Work-Up Testing:  Results for ANTHONY GARCIA (MRN [id number]) as of 3/26/2020 10:02   Ref. Range 3/23/2020 09:27   Sodium Latest Ref Range: 133 - 144 mmol/L 138   Potassium Latest Ref Range: 3.4 - 5.3 mmol/L 3.8   Chloride Latest Ref Range: 94 - 109 mmol/L 107   Carbon Dioxide Latest Ref Range: 20 - 32 mmol/L 28   Urea Nitrogen Latest Ref Range: 7 - 30 mg/dL 12   Creatinine Latest Ref Range: 0.66 - 1.25 mg/dL 0.67   GFR Estimate Latest Ref Range: >60 mL/min/1.73_m2 >90   GFR Estimate If Black Latest Ref Range: >60 mL/min/1.73_m2 >90   Calcium Latest Ref Range: 8.5 - 10.1 mg/dL 9.3   Anion Gap Latest Ref Range: 3 - 14 mmol/L 4   Magnesium Latest Ref Range: 1.6 - 2.3 mg/dL 2.2   Phosphorus Latest Ref Range: 2.5 - 4.5 mg/dL 2.7   Albumin Latest Ref Range: 3.4 - 5.0 g/dL 3.8   Protein Total Latest Ref Range: 6.8 - 8.8 g/dL 6.1 (L)   Bilirubin Total Latest Ref Range: 0.2 - 1.3 mg/dL 0.6   Alkaline Phosphatase Latest Ref Range: 40 - 150 U/L 69   ALT Latest Ref Range: 0 - 70 U/L 18   AST Latest Ref Range: 0 - 45 U/L 9   25 OH Vit D total Latest Ref Range: 20 - 75 ug/L <60   25 OH Vit D2 Latest Units: ug/L <5   25 OH Vit D3 Latest Units: ug/L 55   Beta-2-Microglobulin Latest Ref Range: <2.3 mg/L 2.3 (H)   IGE Latest Ref Range: 0 - 114 KIU/L <2   Lactate Dehydrogenase Latest Ref Range: 85 - 227 U/L 152   Uric Acid Latest Ref Range: 3.5 - 7.2 mg/dL 5.0   Vitamin D Deficiency screening Latest Ref Range: 20 - 75 ug/L 46   Results for JOSEANTHONY (MRN [id number]) as of 3/26/2020 10:02   Ref. Range 3/23/2020 08:36   CREATININE CLEARANCE Unknown Rpt    Creatinine Urine Timed Latest Ref Range: 1.00 - 2.00  1.52   Creatinine Urine Latest Units: mg/dL 121   Protein Random Urine Latest Units: g/L 0.07   Protein Total Urine g/gr Creatinine Latest Ref Range: 0 - 0.2 g/g Cr 0.05   Protein Total 24 Hr Urine Latest Ref Range: 0.04 - 0.23  0.08   Results for JOSE ANTHONY T (MRN [id number]) as of 3/26/2020 10:02   Ref. Range 3/23/2020 09:27   WBC Latest Ref Range: 4.0 - 11.0 10e9/L 5.3   Hemoglobin Latest Ref Range: 13.3 - 17.7 g/dL 13.3   Hematocrit Latest Ref Range: 40.0 - 53.0 % 39.4 (L)   Platelet Count Latest Ref Range: 150 - 450 10e9/L 243   RBC Count Latest Ref Range: 4.4 - 5.9 10e12/L 4.22 (L)   MCV Latest Ref Range: 78 - 100 fl 93   MCH Latest Ref Range: 26.5 - 33.0 pg 31.5   MCHC Latest Ref Range: 31.5 - 36.5 g/dL 33.8   RDW Latest Ref Range: 10.0 - 15.0 % 15.9 (H)   Diff Method Unknown Automated Method   % Neutrophils Latest Units: % 69.1   % Lymphocytes Latest Units: % 15.2   % Monocytes Latest Units: % 12.8   % Eosinophils Latest Units: % 1.5   % Basophils Latest Units: % 0.6   % Immature Granulocytes Latest Units: % 0.8   Nucleated RBCs Latest Ref Range: 0 /100 0   Absolute Neutrophil Latest Ref Range: 1.6 - 8.3 10e9/L 3.6   Absolute Lymphocytes Latest Ref Range: 0.8 - 5.3 10e9/L 0.8   Absolute Monocytes Latest Ref Range: 0.0 - 1.3 10e9/L 0.7   Absolute Eosinophils Latest Ref Range: 0.0 - 0.7 10e9/L 0.1   Absolute Basophils Latest Ref Range: 0.0 - 0.2 10e9/L 0.0   Abs Immature Granulocytes Latest Ref Range: 0 - 0.4 10e9/L 0.0   Absolute Nucleated RBC Unknown 0.0     Results for JOSE ANTHONY T (MRN [id number]) as of 3/26/2020 10:02   Ref. Range 3/23/2020 09:27   ABO Unknown A   RH(D) Unknown Neg   Results for ANTHONY GARCIA (MRN [id number]) as of 3/26/2020 10:02   Ref. Range 3/23/2020 09:30   Color Urine Unknown Yellow   Appearance Urine Unknown Clear   Glucose Urine Latest Ref Range: NEG^Negative mg/dL Negative   Bilirubin Urine Latest Ref Range: NEG^Negative   Negative   Ketones Urine Latest Ref Range: NEG^Negative mg/dL Negative   Specific Gravity Urine Latest Ref Range: 1.003 - 1.035  1.012   pH Urine Latest Ref Range: 5.0 - 7.0 pH 6.0   Protein Albumin Urine Latest Ref Range: NEG^Negative mg/dL Negative   Urobilinogen mg/dL Latest Ref Range: 0.0 - 2.0 mg/dL 0.0   Nitrite Urine Latest Ref Range: NEG^Negative  Negative   Blood Urine Latest Ref Range: NEG^Negative  Negative   Leukocyte Esterase Urine Latest Ref Range: NEG^Negative  Negative   Source Unknown Midstream Urine   WBC Urine Latest Ref Range: 0 - 5 /HPF <1   RBC Urine Latest Ref Range: 0 - 2 /HPF <1   Bacteria Urine Latest Ref Range: NEG^Negative /HPF Few (A)   Mucous Urine Latest Ref Range: NEG^Negative /LPF Present (A)   Results for ANTHONY GARCIA (MRN [id number]) as of 3/26/2020 10:02   Ref. Range 3/23/2020 09:27   EBV Capsid Antibody IgG Latest Ref Range: 0.0 - 0.8 AI >8.0 (H)   HERPES SIMPLEX VIRUS TYPE 1 AND 2 IGG Unknown Rpt (A)   MPX Series Unknown Nonreactive   West Nile Virus by PCR Unknown Nonreactive   Donor Hep B Surf Agn Latest Ref Range: NR^Nonreactive  Nonreactive   Donor Hep B Core Lesa Latest Ref Range: NR^Nonreactive  Nonreactive   Donor Hepatitis C Lesa Latest Ref Range: NR^Nonreactive  Nonreactive   Donor HIV 1&2 Antibody Latest Ref Range: NR^Nonreactive  Nonreactive   Donor HTLV 1&2 Antibody Latest Ref Range: NR^Nonreactive  Nonreactive   Donor Cytomegalovirus Lesa Latest Ref Range: NR^Nonreactive  Positive (A)   Donor Treponema PAL LESA Unknown Nonreactive   Trypanosoma Cruzi Latest Ref Range: NR^Nonreactive  Nonreactive       3/13/20 Bone Marrow:  REFERRAL MATERIAL ID:   Received from [facility], [place], [place]    are 9 stained slides labeled   [id number] collected 03/11/2020 are now designated [id number]. Also received   is a copy of the referring   pathologist's report with patient identifying information.     FINAL DIAGNOSIS:   Bone marrow, posterior iliac  crest, decalcified trephine biopsy, aspirate   clot, touch imprints, aspirate   smears, and peripheral blood ([id number], 03/11/2020):     - Persistent plasma cell neoplasm with approximately 5% kappa restricted    plasma cells on the trephine core     - Variable marrow cellularity, overall 50% with trilineage hematopoiesis     - Peripheral blood showing normocytic anemia (12.4 g/dL, 94.4 fL)     COMMENT:   Ancillary studies:   By report, flow cytometric immunophenotyping showed a kappa restricted   plasma cell population.   By report, FISH studies were also requested, but the results are not   available for review, and may be pending   at this time.     Results for ANTHONY GARCIA (MRN [id number]) as of 3/26/2020 10:02   Ref. Range 3/23/2020 09:27   IGA Latest Ref Range: 84 - 499 mg/dL 10 (L)   IGG Latest Ref Range: 610 - 1,616 mg/dL 260 (L)   IGM Latest Ref Range: 35 - 242 mg/dL <10 (L)   Immunofixation ELP Unknown (Note)   Kappa Free Lt Chain Latest Ref Range: 0.33 - 1.94 mg/dL 43.53 (H)   Kappa Lambda Ratio Latest Ref Range: 0.26 - 1.65  229.11 (H)   Lambda Free Lt Chain Latest Ref Range: 0.57 - 2.63 mg/dL 0.19 (L)   Monoclonal Peak Latest Ref Range: 0.0 g/dL 0.1 (H)       3/25/2020: PET CT      3/25/20: Bone Survey:  IMPRESSION: Unchanged nonspecific lucencies in the skull, otherwise no  distinct lytic lesions identified in the axial or appendicular  skeletal system. No compression fractures.     I have personally reviewed the examination and initial interpretation  and I agree with the findings.     YUMIKO VANCE MD    Results for ANTHONY GARCIA (MRN [id number]) as of 3/26/2020 10:02   Ref. Range 3/23/2020 09:58   FVC-Pred Latest Units: L 4.43   FVC-Pre Latest Units: L 3.77   FVC-%Pred-Pre Latest Units: % 85   FEV1-Pre Latest Units: L 2.63   FEV1-%Pred-Pre Latest Units: % 79   FEV1FVC-Pred Latest Units: % 75   FEV1FVC-Pre Latest Units: % 70   FEV1SVC-Pred Latest Units: % 66   FEV1SVC-Pre Latest Units: % 62    FEV1FEV6-Pred Latest Units: % 77   FEV1FEV6-Pre Latest Units: % 73   FEFMax-Pred Latest Units: L/sec 8.51   FEFMax-Pre Latest Units: L/sec 6.94   FEFMax-%Pred-Pre Latest Units: % 81   FIFMax-Pre Latest Units: L/sec 4.20   ExpTime-Pre Latest Units: sec 9.64   FRCPleth-Pred Latest Units: L 3.85   FRCPleth-Pre Latest Units: L 4.47   FRCPleth-%Pred-Pre Latest Units: % 116   RVPleth-Pred Latest Units: L 2.77   RVPleth-Pre Latest Units: L 3.90   RVPleth-%Pred-Pre Latest Units: % 140   TLCPleth-Pred Latest Units: L 7.53   TLCPleth-Pre Latest Units: L 8.13   TLCPleth-%Pred-Pre Latest Units: % 107   ERV-Pred Latest Units: L 1.10   ERV-Pre Latest Units: L 0.57   ERV-%Pred-Pre Latest Units: % 51   IC-Pred Latest Units: L 3.94   IC-Pre Latest Units: L 3.66   IC-%Pred-Pre Latest Units: % 92   VC-Pred Latest Units: L 5.04   VC-Pre Latest Units: L 4.22   VC-%Pred-Pre Latest Units: % 83   DLCOunc-Pred Latest Units: ml/min/mmHg 27.03   DLCOunc-Pre Latest Units: ml/min/mmHg 30.78   DLCOunc-%Pred-Pre Latest Units: % 113   DLCOcor-Pre Latest Units: ml/min/mmHg 32.02   DLCOcor-%Pred-Pre Latest Units: % 118   VA-Pre Latest Units: L 6.45   VA-%Pred-Pre Latest Units: % 94       Echo:  Interpretation Summary  Global and regional left and right ventricular function is normal. LVEF 55%  based on volumetric 3D analysis.  Global peak LV longitudinal strain is averaged at -20.0%. This is within  reported normal limits (normal <-18%).  No significant valvular abnormalities were noted.  Previous study not available for comparison    Assessment and plan: 74-year-old gentleman with recent diagnosis of plasma cell leukemia currently showing a good serologic response to Cytoxan plus Velcade plus dexamethasone    1.  Plasma cell leukemia:    Has had a wonderful response to CyBorD X 4 with a VGPR (> 90% reduction in light chain reduction) and meets criteria to move forward with transplant. Given that he is not standard myeloma and is plasma cell  leukemia his risk of transplant delay is higher; however, in light of the COVID pandemic there is additional caution and discussion to be had as the risk/benefit is altered. We discussed the risk of proceeding and the unknowns with the covid in terms of severity and spread versus the risk of the plasma cell leukemia breaking through conventional therapy. There is no way to know what will happen with either.    Thus, to minimize risk we are proceeding as follows:    - Sign consents today  - Wait 1 week to see the trend of the pandemic before moving forward  ---- If there is a big surge in cases and mortality then we will hold transplant and do 2 more months of conventional chemo and then reassess  -- If there is a leveling off of cases and no major mortality then we will push ahead with collections. While typically I would do cytoxan mobilization with 5% remaining plasma cells, to limit toxicity/limit transfusion needs/limit neutropenia duration, I would alter that plan and do G-CSF priming alone so that his only time of neutropenia/transfusion needs will be during the auto. He voiced agreement with that plan.    In today's visit, we discussed in detail the research for which You Silva is eligible. We discussed the potential risks and potential benefits of each protocol individually. We explained potential alternatives to the protocols discussed. We explained to the patient that participation is voluntary and that consent may be withdrawn at any time.     The patient completed the last round of treatment on 3/9/2020    HCT-CI score: 3 (for depression and moderate PFT abnormalities but less applicable to Auto transplants). We counseled the patient about the impact of this on the risk of treatment related and overall mortality. The score fit within treatment protocol eligibility criteria.    Karnofsky performance score: 90    Active infections:  None  Reproductive status: What methods of birth control does the  patient plan to use during the treatment period beginning with conditioning and ending with the discontinuation of immune suppression (indicate with an X all that apply):  __X The patient is confirmed to be sterile or post-menopausal  __ Sexual abstinence  __ Condoms  __ Implants  __ Injectables  __ Oral contraceptives  __ Intrauterine devices (IUD)  __ Other (describe)    The patient received appropriate reproductive counseling and agreed with the need for effective contraception during the treatment procedures.      Dental health suitable to proceed: yes    After our detailed discussion above, the patient signed the following consents for treatment and protocols:    MT [id number]    Blood transfusion consent     The patient will receive a signed copy of the consents. The patient had opportunity to ask questions that were answered to the best of my ability and to the patient's apparent satisfaction.      2. ID: HSV IgG+, CMV IgG+, EBV IgG +  -- acyclovir and bactrim prophy    3. CV: BP up a little but hurried      4. Pulmon: mild COPD and AMRITA.     5. FEN/Renal: stable    6. Psych: depression. On celexa    Final Plan:    - Sign consents today  - Wait 1 week to see the trend of the pandemic before moving forward  ---- If there is a big surge in cases and mortality then we will hold transplant and do 2 more months of conventional chemo and then reassess  -- If there is a leveling off of cases and no major mortality then we will push ahead with collections. While typically I would do cytoxan mobilization with 5% remaining plasma cells, to limit toxicity/limit transfusion needs/limit neutropenia duration, I would alter that plan and do G-CSF priming alone so that his only time of neutropenia/transfusion needs will be during the auto. He voiced agreement with that plan.  - I will call him next Wednesday to check in and make final decision    40 minutes spent reviewing and discussing this plan    Betty Maradiaga,  MD

## 2020-03-26 ENCOUNTER — MEDICAL CORRESPONDENCE (OUTPATIENT)
Dept: TRANSPLANT | Facility: CLINIC | Age: 75
End: 2020-03-26

## 2020-03-26 ENCOUNTER — VIRTUAL VISIT (OUTPATIENT)
Dept: TRANSPLANT | Facility: CLINIC | Age: 75
End: 2020-03-26
Attending: INTERNAL MEDICINE
Payer: COMMERCIAL

## 2020-03-26 VITALS
DIASTOLIC BLOOD PRESSURE: 92 MMHG | TEMPERATURE: 96.3 F | BODY MASS INDEX: 28.41 KG/M2 | RESPIRATION RATE: 18 BRPM | WEIGHT: 209.5 LBS | SYSTOLIC BLOOD PRESSURE: 151 MMHG | OXYGEN SATURATION: 96 % | HEART RATE: 80 BPM

## 2020-03-26 DIAGNOSIS — Z86.2 PERSONAL HISTORY OF DISEASES OF BLOOD AND BLOOD-FORMING ORGANS: ICD-10-CM

## 2020-03-26 DIAGNOSIS — C90.11 PLASMA CELL LEUKEMIA IN REMISSION (H): ICD-10-CM

## 2020-03-26 PROCEDURE — G0463 HOSPITAL OUTPT CLINIC VISIT: HCPCS

## 2020-03-26 RX ORDER — MULTIVIT-MIN/IRON/FOLIC ACID/K 18-600-40
1 CAPSULE ORAL
COMMUNITY
End: 2020-03-26

## 2020-03-26 RX ORDER — MULTIPLE VITAMINS W/ MINERALS TAB 9MG-400MCG
1 TAB ORAL DAILY
COMMUNITY

## 2020-03-26 ASSESSMENT — PAIN SCALES - GENERAL: PAINLEVEL: NO PAIN (0)

## 2020-03-26 ASSESSMENT — ANXIETY QUESTIONNAIRES: GAD7 TOTAL SCORE: 2

## 2020-03-26 NOTE — LETTER
3/26/2020       RE: You Silva  [address] [facility] [address]     Dear Colleague,    Thank you for referring your patient, You Silva, to the [facility] BLOOD AND MARROW TRANSPLANT at [facility]. Please see a copy of my visit note below.    BMT Clinic Visit  Mar 26, 2020      Reason for Visit: review of work-up testing and consent signing    Disease and Treatment History:  1.  Presented to his primary care doc for annual follow-up and was found to have abnormal blood counts in November 2019 with circulating atypical plasma cells.  Noted 6 months of preceding increasing fatigue and weight loss of about 15 pounds  2. Peripheral blood on 11/19/2019 had 41% atypical plasma cells.  -Bone marrow biopsy on 11/20/2019 was 90% cellularity with diffuse and extensive infiltrate of plasma cells. FISH showed Gain 1q, Del 13q, T(14;16) consistent with high risk disease   -  kappa light chains at diagnosis elevated at 11,055 and lambda light chains at 1.8.  Serum protein electrophoresis monoclonal peak 0.13  -Bone survey on 11/25/2019 was negative   -MUGA showed an EF of 67%  3. Initiated on weekly Cytoxan plus biweekly Velcade plus Decadron.  Cycle 1 was on 11/20/20192.   -Initial great response with kappa light chains down to 252 as of the end of November  -Cycle 2 (12/23/2019) adjusted to standard dose Cytoxan and Velcade and dexamethasone weekly   - Cycle 3 began on January 22, 2020  -  Cycle 4 began on 2/19/2020 - 3/9/20      HPI: Haja is here to sign consents. Today he notes that he is feeling good. No current fevers or chills, no chest pain or SOB, no nausea or vomiting, no diarrhea, no bleeding or bruising. Appetite and energy good. No neuropathy. Did have a cap break off his tooth but no pain and no other dental issues.     10 point review of systems otherwise negative.      Past medical history:  1.  Plasma cell leukemia see HPI  2.  History of mild COPD  3.  History of  esophageal spasm and hiatal hernia  4.  History of hypertension  5.  History of obstructive sleep apnea using a CPAP machine  6.  History of PTSD  7.  History of major depressive disorder  8.  History of numerous basal cell carcinomas status post numerous both surgery    No other diabetes, liver, kidney, cardiac issues    Medications include Bactrim, acyclovir,      Physical Exam:  BP (!) 151/92   Pulse 80   Temp 96.3  F (35.7  C) (Oral)   Resp 18   Wt 95 kg (209 lb 8 oz)   SpO2 96%   BMI 28.41 kg/m    Gen: WEll appearing. KPS 90  HEENT: one broken off tooth but no dental inflammation  Lungs: CTAB no crackles or wheezes  CV: RRR  Abd: soft  Ext: no edema    Work-Up Testing:  Results for ANTHONY GARCIA (MRN [id number]) as of 3/26/2020 10:02   Ref. Range 3/23/2020 09:27   Sodium Latest Ref Range: 133 - 144 mmol/L 138   Potassium Latest Ref Range: 3.4 - 5.3 mmol/L 3.8   Chloride Latest Ref Range: 94 - 109 mmol/L 107   Carbon Dioxide Latest Ref Range: 20 - 32 mmol/L 28   Urea Nitrogen Latest Ref Range: 7 - 30 mg/dL 12   Creatinine Latest Ref Range: 0.66 - 1.25 mg/dL 0.67   GFR Estimate Latest Ref Range: >60 mL/min/1.73_m2 >90   GFR Estimate If Black Latest Ref Range: >60 mL/min/1.73_m2 >90   Calcium Latest Ref Range: 8.5 - 10.1 mg/dL 9.3   Anion Gap Latest Ref Range: 3 - 14 mmol/L 4   Magnesium Latest Ref Range: 1.6 - 2.3 mg/dL 2.2   Phosphorus Latest Ref Range: 2.5 - 4.5 mg/dL 2.7   Albumin Latest Ref Range: 3.4 - 5.0 g/dL 3.8   Protein Total Latest Ref Range: 6.8 - 8.8 g/dL 6.1 (L)   Bilirubin Total Latest Ref Range: 0.2 - 1.3 mg/dL 0.6   Alkaline Phosphatase Latest Ref Range: 40 - 150 U/L 69   ALT Latest Ref Range: 0 - 70 U/L 18   AST Latest Ref Range: 0 - 45 U/L 9   25 OH Vit D total Latest Ref Range: 20 - 75 ug/L <60   25 OH Vit D2 Latest Units: ug/L <5   25 OH Vit D3 Latest Units: ug/L 55   Beta-2-Microglobulin Latest Ref Range: <2.3 mg/L 2.3 (H)   IGE Latest Ref Range: 0 - 114 KIU/L <2   Lactate  Dehydrogenase Latest Ref Range: 85 - 227 U/L 152   Uric Acid Latest Ref Range: 3.5 - 7.2 mg/dL 5.0   Vitamin D Deficiency screening Latest Ref Range: 20 - 75 ug/L 46   Results for ANTHONY GARCIA (MRN [id number]) as of 3/26/2020 10:02   Ref. Range 3/23/2020 08:36   CREATININE CLEARANCE Unknown Rpt   Creatinine Urine Timed Latest Ref Range: 1.00 - 2.00  1.52   Creatinine Urine Latest Units: mg/dL 121   Protein Random Urine Latest Units: g/L 0.07   Protein Total Urine g/gr Creatinine Latest Ref Range: 0 - 0.2 g/g Cr 0.05   Protein Total 24 Hr Urine Latest Ref Range: 0.04 - 0.23  0.08   Results for ANTHONY GARCIA (MRN [id number]) as of 3/26/2020 10:02   Ref. Range 3/23/2020 09:27   WBC Latest Ref Range: 4.0 - 11.0 10e9/L 5.3   Hemoglobin Latest Ref Range: 13.3 - 17.7 g/dL 13.3   Hematocrit Latest Ref Range: 40.0 - 53.0 % 39.4 (L)   Platelet Count Latest Ref Range: 150 - 450 10e9/L 243   RBC Count Latest Ref Range: 4.4 - 5.9 10e12/L 4.22 (L)   MCV Latest Ref Range: 78 - 100 fl 93   MCH Latest Ref Range: 26.5 - 33.0 pg 31.5   MCHC Latest Ref Range: 31.5 - 36.5 g/dL 33.8   RDW Latest Ref Range: 10.0 - 15.0 % 15.9 (H)   Diff Method Unknown Automated Method   % Neutrophils Latest Units: % 69.1   % Lymphocytes Latest Units: % 15.2   % Monocytes Latest Units: % 12.8   % Eosinophils Latest Units: % 1.5   % Basophils Latest Units: % 0.6   % Immature Granulocytes Latest Units: % 0.8   Nucleated RBCs Latest Ref Range: 0 /100 0   Absolute Neutrophil Latest Ref Range: 1.6 - 8.3 10e9/L 3.6   Absolute Lymphocytes Latest Ref Range: 0.8 - 5.3 10e9/L 0.8   Absolute Monocytes Latest Ref Range: 0.0 - 1.3 10e9/L 0.7   Absolute Eosinophils Latest Ref Range: 0.0 - 0.7 10e9/L 0.1   Absolute Basophils Latest Ref Range: 0.0 - 0.2 10e9/L 0.0   Abs Immature Granulocytes Latest Ref Range: 0 - 0.4 10e9/L 0.0   Absolute Nucleated RBC Unknown 0.0     Results for JOSE, ANTHONY T (MRN [id number]) as of 3/26/2020 10:02   Ref. Range 3/23/2020 09:27   ABO  Unknown A   RH(D) Unknown Neg   Results for ANTHONY GARCIA (MRN [id number]) as of 3/26/2020 10:02   Ref. Range 3/23/2020 09:30   Color Urine Unknown Yellow   Appearance Urine Unknown Clear   Glucose Urine Latest Ref Range: NEG^Negative mg/dL Negative   Bilirubin Urine Latest Ref Range: NEG^Negative  Negative   Ketones Urine Latest Ref Range: NEG^Negative mg/dL Negative   Specific Gravity Urine Latest Ref Range: 1.003 - 1.035  1.012   pH Urine Latest Ref Range: 5.0 - 7.0 pH 6.0   Protein Albumin Urine Latest Ref Range: NEG^Negative mg/dL Negative   Urobilinogen mg/dL Latest Ref Range: 0.0 - 2.0 mg/dL 0.0   Nitrite Urine Latest Ref Range: NEG^Negative  Negative   Blood Urine Latest Ref Range: NEG^Negative  Negative   Leukocyte Esterase Urine Latest Ref Range: NEG^Negative  Negative   Source Unknown Midstream Urine   WBC Urine Latest Ref Range: 0 - 5 /HPF <1   RBC Urine Latest Ref Range: 0 - 2 /HPF <1   Bacteria Urine Latest Ref Range: NEG^Negative /HPF Few (A)   Mucous Urine Latest Ref Range: NEG^Negative /LPF Present (A)   Results for ANTHONY GARCIA (MRN [id number]) as of 3/26/2020 10:02   Ref. Range 3/23/2020 09:27   EBV Capsid Antibody IgG Latest Ref Range: 0.0 - 0.8 AI >8.0 (H)   HERPES SIMPLEX VIRUS TYPE 1 AND 2 IGG Unknown Rpt (A)   MPX Series Unknown Nonreactive   West Nile Virus by PCR Unknown Nonreactive   Donor Hep B Surf Agn Latest Ref Range: NR^Nonreactive  Nonreactive   Donor Hep B Core Lesa Latest Ref Range: NR^Nonreactive  Nonreactive   Donor Hepatitis C Lesa Latest Ref Range: NR^Nonreactive  Nonreactive   Donor HIV 1&2 Antibody Latest Ref Range: NR^Nonreactive  Nonreactive   Donor HTLV 1&2 Antibody Latest Ref Range: NR^Nonreactive  Nonreactive   Donor Cytomegalovirus Lesa Latest Ref Range: NR^Nonreactive  Positive (A)   Donor Treponema PAL LESA Unknown Nonreactive   Trypanosoma Cruzi Latest Ref Range: NR^Nonreactive  Nonreactive       3/13/20 Bone Marrow:  REFERRAL MATERIAL ID:   Received from [email or web address]  [place], [place]    are 9 stained slides labeled   [id number] collected 03/11/2020 are now designated [id number]. Also received   is a copy of the referring   pathologist's report with patient identifying information.     FINAL DIAGNOSIS:   Bone marrow, posterior iliac crest, decalcified trephine biopsy, aspirate   clot, touch imprints, aspirate   smears, and peripheral blood ([id number], 03/11/2020):     - Persistent plasma cell neoplasm with approximately 5% kappa restricted    plasma cells on the trephine core     - Variable marrow cellularity, overall 50% with trilineage hematopoiesis     - Peripheral blood showing normocytic anemia (12.4 g/dL, 94.4 fL)     COMMENT:   Ancillary studies:   By report, flow cytometric immunophenotyping showed a kappa restricted   plasma cell population.   By report, FISH studies were also requested, but the results are not   available for review, and may be pending   at this time.     Results for ANTHONY GARCIA (MRN [id number]) as of 3/26/2020 10:02   Ref. Range 3/23/2020 09:27   IGA Latest Ref Range: 84 - 499 mg/dL 10 (L)   IGG Latest Ref Range: 610 - 1,616 mg/dL 260 (L)   IGM Latest Ref Range: 35 - 242 mg/dL <10 (L)   Immunofixation ELP Unknown (Note)   Kappa Free Lt Chain Latest Ref Range: 0.33 - 1.94 mg/dL 43.53 (H)   Kappa Lambda Ratio Latest Ref Range: 0.26 - 1.65  229.11 (H)   Lambda Free Lt Chain Latest Ref Range: 0.57 - 2.63 mg/dL 0.19 (L)   Monoclonal Peak Latest Ref Range: 0.0 g/dL 0.1 (H)       3/25/2020: PET CT      3/25/20: Bone Survey:  IMPRESSION: Unchanged nonspecific lucencies in the skull, otherwise no  distinct lytic lesions identified in the axial or appendicular  skeletal system. No compression fractures.     I have personally reviewed the examination and initial interpretation  and I agree with the findings.     YUMIKO VANCE MD    Results for ANTHONY GARCIA (MRN [id number]) as of 3/26/2020 10:02   Ref. Range 3/23/2020 09:58    FVC-Pred Latest Units: L 4.43   FVC-Pre Latest Units: L 3.77   FVC-%Pred-Pre Latest Units: % 85   FEV1-Pre Latest Units: L 2.63   FEV1-%Pred-Pre Latest Units: % 79   FEV1FVC-Pred Latest Units: % 75   FEV1FVC-Pre Latest Units: % 70   FEV1SVC-Pred Latest Units: % 66   FEV1SVC-Pre Latest Units: % 62   FEV1FEV6-Pred Latest Units: % 77   FEV1FEV6-Pre Latest Units: % 73   FEFMax-Pred Latest Units: L/sec 8.51   FEFMax-Pre Latest Units: L/sec 6.94   FEFMax-%Pred-Pre Latest Units: % 81   FIFMax-Pre Latest Units: L/sec 4.20   ExpTime-Pre Latest Units: sec 9.64   FRCPleth-Pred Latest Units: L 3.85   FRCPleth-Pre Latest Units: L 4.47   FRCPleth-%Pred-Pre Latest Units: % 116   RVPleth-Pred Latest Units: L 2.77   RVPleth-Pre Latest Units: L 3.90   RVPleth-%Pred-Pre Latest Units: % 140   TLCPleth-Pred Latest Units: L 7.53   TLCPleth-Pre Latest Units: L 8.13   TLCPleth-%Pred-Pre Latest Units: % 107   ERV-Pred Latest Units: L 1.10   ERV-Pre Latest Units: L 0.57   ERV-%Pred-Pre Latest Units: % 51   IC-Pred Latest Units: L 3.94   IC-Pre Latest Units: L 3.66   IC-%Pred-Pre Latest Units: % 92   VC-Pred Latest Units: L 5.04   VC-Pre Latest Units: L 4.22   VC-%Pred-Pre Latest Units: % 83   DLCOunc-Pred Latest Units: ml/min/mmHg 27.03   DLCOunc-Pre Latest Units: ml/min/mmHg 30.78   DLCOunc-%Pred-Pre Latest Units: % 113   DLCOcor-Pre Latest Units: ml/min/mmHg 32.02   DLCOcor-%Pred-Pre Latest Units: % 118   VA-Pre Latest Units: L 6.45   VA-%Pred-Pre Latest Units: % 94       Echo:  Interpretation Summary  Global and regional left and right ventricular function is normal. LVEF 55%  based on volumetric 3D analysis.  Global peak LV longitudinal strain is averaged at -20.0%. This is within  reported normal limits (normal <-18%).  No significant valvular abnormalities were noted.  Previous study not available for comparison    Assessment and plan: 74-year-old gentleman with recent diagnosis of plasma cell leukemia currently showing a good  serologic response to Cytoxan plus Velcade plus dexamethasone    1.  Plasma cell leukemia:    Has had a wonderful response to CyBorD X 4 with a VGPR (> 90% reduction in light chain reduction) and meets criteria to move forward with transplant. Given that he is not standard myeloma and is plasma cell leukemia his risk of transplant delay is higher; however, in light of the COVID pandemic there is additional caution and discussion to be had as the risk/benefit is altered. We discussed the risk of proceeding and the unknowns with the covid in terms of severity and spread versus the risk of the plasma cell leukemia breaking through conventional therapy. There is no way to know what will happen with either.    Thus, to minimize risk we are proceeding as follows:    - Sign consents today  - Wait 1 week to see the trend of the pandemic before moving forward  ---- If there is a big surge in cases and mortality then we will hold transplant and do 2 more months of conventional chemo and then reassess  -- If there is a leveling off of cases and no major mortality then we will push ahead with collections. While typically I would do cytoxan mobilization with 5% remaining plasma cells, to limit toxicity/limit transfusion needs/limit neutropenia duration, I would alter that plan and do G-CSF priming alone so that his only time of neutropenia/transfusion needs will be during the auto. He voiced agreement with that plan.    In today's visit, we discussed in detail the research for which You Silva is eligible. We discussed the potential risks and potential benefits of each protocol individually. We explained potential alternatives to the protocols discussed. We explained to the patient that participation is voluntary and that consent may be withdrawn at any time.     The patient completed the last round of treatment on 3/9/2020    HCT-CI score: 3 (for depression and moderate PFT abnormalities but less applicable to Auto  transplants). We counseled the patient about the impact of this on the risk of treatment related and overall mortality. The score fit within treatment protocol eligibility criteria.    Karnofsky performance score: 90    Active infections:  None  Reproductive status: What methods of birth control does the patient plan to use during the treatment period beginning with conditioning and ending with the discontinuation of immune suppression (indicate with an X all that apply):  __X The patient is confirmed to be sterile or post-menopausal  __ Sexual abstinence  __ Condoms  __ Implants  __ Injectables  __ Oral contraceptives  __ Intrauterine devices (IUD)  __ Other (describe)    The patient received appropriate reproductive counseling and agreed with the need for effective contraception during the treatment procedures.      Dental health suitable to proceed: yes    After our detailed discussion above, the patient signed the following consents for treatment and protocols:    MT 2016-35    Blood transfusion consent     The patient will receive a signed copy of the consents. The patient had opportunity to ask questions that were answered to the best of my ability and to the patient's apparent satisfaction.      2. ID: HSV IgG+, CMV IgG+, EBV IgG +  -- acyclovir and bactrim prophy    3. CV: BP up a little but hurried      4. Pulmon: mild COPD and AMRITA.     5. FEN/Renal: stable    6. Psych: depression. On celexa    Final Plan:    - Sign consents today  - Wait 1 week to see the trend of the pandemic before moving forward  ---- If there is a big surge in cases and mortality then we will hold transplant and do 2 more months of conventional chemo and then reassess  -- If there is a leveling off of cases and no major mortality then we will push ahead with collections. While typically I would do cytoxan mobilization with 5% remaining plasma cells, to limit toxicity/limit transfusion needs/limit neutropenia duration, I would alter  that plan and do G-CSF priming alone so that his only time of neutropenia/transfusion needs will be during the auto. He voiced agreement with that plan.  - I will call him next Wednesday to check in and make final decision    40 minutes spent reviewing and discussing this plan    Betty Maradiaga MD

## 2020-03-26 NOTE — PROGRESS NOTES
BMT Teaching Flowsheet    You Silva is a 74 year old male  Diagnoses of Plasma cell leukemia in remission (H) and Personal history of diseases of blood and blood-forming organs were pertinent to this visit.    Teaching Topic: Autologous PBSCT for plasma cell leukemia, Protocol 2016-35 *phone visit with patient and wife on speaker phone*    Person(s) involved in teaching: Patient and Spouse  Motivation Level  Asks Questions: Yes  Eager to Learn: Yes  Cooperative: Yes  Receptive (willing/able to accept information): Yes  Any cultural factors/Sabianist beliefs that may influence understanding or compliance? No    Patient and wife demonstrates understanding of the following:  - Reason for the appointment, diagnosis and treatment plan: Yes  - Knowledge of proper use of medications and conditions for which they are ordered (with special attention to potential side effects or drug interactions): Yes  - Which situations necessitate calling provider and whom to contact: Yes    Teaching concerns addressed: Reviewed collections and transplant calender, consents, medications, side effects, clinic flow and routine, limitations after transplant, caregiver role after transplant, and when to call.  Patient's wife voiced concern with transplant and COVID-19, which she will discuss with Dr Maradiaga today.    Proper use and care of (medical equipment, care aids, etc.) NA  Pain management techniques: Yes  Patient instructed on hand hygiene: Yes  How and/when to access community resources: Yes    Infection Control:  Patient and wife demonstrates understanding of the following:  Surgical procedure site care taught NA  Signs and symptoms of infection taught Yes  Wound care taught NA  Central venous catheter care taught NA    Instructional Materials Used/Given: BMT teaching binder with copies of consents.    Time spent with patient: 75 minutes phone call    Specific Concerns: No, explain: patient verbalized understanding of provided  material via teach back method. No further questions or concerns at this time.

## 2020-03-26 NOTE — NURSING NOTE
"Oncology Rooming Note    March 26, 2020 10:02 AM   You Silva is a 74 year old male who presents for:    Chief Complaint   Patient presents with     RECHECK     Pt is here for a rtn [facility]     Initial Vitals: Blood Pressure (Abnormal) 151/92   Pulse 80   Temperature 96.3  F (35.7  C) (Oral)   Respiration 18   Weight 95 kg (209 lb 8 oz)   Oxygen Saturation 96%   Body Mass Index 28.41 kg/m   Estimated body mass index is 28.41 kg/m  as calculated from the following:    Height as of 3/24/20: 1.829 m (6' 0.01\").    Weight as of this encounter: 95 kg (209 lb 8 oz). Body surface area is 2.2 meters squared.  No Pain (0) Comment: Data Unavailable   No LMP for male patient.  Allergies reviewed: Yes  Medications reviewed: Yes    Medications: Medication refills not needed today.  Pharmacy name entered into EPIC: [facility] - [place], [place] - [address]    Clinical concerns: none       Pati Acosta MA            "

## 2020-03-30 LAB
DLCOCOR-%PRED-PRE: 118 %
DLCOCOR-PRE: 32.02 ML/MIN/MMHG
DLCOUNC-%PRED-PRE: 113 %
DLCOUNC-PRE: 30.78 ML/MIN/MMHG
DLCOUNC-PRED: 27.03 ML/MIN/MMHG
ERV-%PRED-PRE: 51 %
ERV-PRE: 0.57 L
ERV-PRED: 1.1 L
EXPTIME-PRE: 9.64 SEC
FEF2575-%PRED-PRE: 64 %
FEF2575-PRE: 1.58 L/SEC
FEF2575-PRED: 2.43 L/SEC
FEFMAX-%PRED-PRE: 81 %
FEFMAX-PRE: 6.94 L/SEC
FEFMAX-PRED: 8.51 L/SEC
FEV1-%PRED-PRE: 79 %
FEV1-PRE: 2.63 L
FEV1FEV6-PRE: 73 %
FEV1FEV6-PRED: 77 %
FEV1FVC-PRE: 70 %
FEV1FVC-PRED: 75 %
FEV1SVC-PRE: 62 %
FEV1SVC-PRED: 66 %
FIFMAX-PRE: 4.2 L/SEC
FRCPLETH-%PRED-PRE: 116 %
FRCPLETH-PRE: 4.47 L
FRCPLETH-PRED: 3.85 L
FVC-%PRED-PRE: 85 %
FVC-PRE: 3.77 L
FVC-PRED: 4.43 L
IC-%PRED-PRE: 92 %
IC-PRE: 3.66 L
IC-PRED: 3.94 L
RVPLETH-%PRED-PRE: 140 %
RVPLETH-PRE: 3.9 L
RVPLETH-PRED: 2.77 L
TLCPLETH-%PRED-PRE: 107 %
TLCPLETH-PRE: 8.13 L
TLCPLETH-PRED: 7.53 L
VA-%PRED-PRE: 94 %
VA-PRE: 6.45 L
VC-%PRED-PRE: 83 %
VC-PRE: 4.22 L
VC-PRED: 5.04 L

## 2020-04-01 ENCOUNTER — TELEPHONE (OUTPATIENT)
Dept: TRANSPLANT | Facility: CLINIC | Age: 75
End: 2020-04-01

## 2020-04-01 NOTE — TELEPHONE ENCOUNTER
Called Haja to touch base and discuss next steps in light of the COVID-19 pandemic. WE talked through the pros and cons of pushing ahead now with stem cell transplant versus [facility] to wait out the peak/surge of the COVID cases. After reviewing both sides, we came to the joint decision [facility] for the next month or two to continue to work on disease control and then reassess moving forward for transplant in the next couple months.    Will discuss with Dr. Mcleod. His Light chain response was pretty impressive from 11/2019 - 12/2019 but has leveled off in terms of the light chain decrease over the last couple months. Thus, consideration could be made for transitioning his chemotherapy to include daratumumab and consider possibly Daratumumab weekly + Velcade weekly+ Dex weekly to get an ongoing deeper response. Will plan to check light chains every month to keep close tabs moving forward.    Left a message with the BMT office that we will be delaying him for a couple months. [facility] regarding these [facility].    Betty Maradiaga MD

## 2020-10-26 ENCOUNTER — MEDICAL CORRESPONDENCE (OUTPATIENT)
Dept: TRANSPLANT | Facility: CLINIC | Age: 75
End: 2020-10-26

## 2020-12-14 ENCOUNTER — HEALTH MAINTENANCE LETTER (OUTPATIENT)
Age: 75
End: 2020-12-14

## 2021-10-02 ENCOUNTER — HEALTH MAINTENANCE LETTER (OUTPATIENT)
Age: 76
End: 2021-10-02

## 2022-01-01 ENCOUNTER — HEALTH MAINTENANCE LETTER (OUTPATIENT)
Age: 77
End: 2022-01-01